# Patient Record
Sex: FEMALE | Race: WHITE | NOT HISPANIC OR LATINO | Employment: FULL TIME | URBAN - METROPOLITAN AREA
[De-identification: names, ages, dates, MRNs, and addresses within clinical notes are randomized per-mention and may not be internally consistent; named-entity substitution may affect disease eponyms.]

---

## 2017-05-30 ENCOUNTER — OFFICE VISIT (OUTPATIENT)
Dept: PULMONOLOGY | Facility: CLINIC | Age: 57
End: 2017-05-30
Attending: INTERNAL MEDICINE
Payer: COMMERCIAL

## 2017-05-30 VITALS
SYSTOLIC BLOOD PRESSURE: 114 MMHG | OXYGEN SATURATION: 99 % | TEMPERATURE: 96.9 F | WEIGHT: 146.39 LBS | DIASTOLIC BLOOD PRESSURE: 78 MMHG | HEART RATE: 68 BPM | BODY MASS INDEX: 25.93 KG/M2 | RESPIRATION RATE: 16 BRPM

## 2017-05-30 DIAGNOSIS — R91.8 PULMONARY NODULES: Primary | ICD-10-CM

## 2017-05-30 PROCEDURE — 99212 OFFICE O/P EST SF 10 MIN: CPT | Mod: ZF

## 2017-05-30 ASSESSMENT — PAIN SCALES - GENERAL: PAINLEVEL: NO PAIN (0)

## 2017-05-30 NOTE — PATIENT INSTRUCTIONS
1.  No further follow up required.   2.  Follow up with your primary in regards to the liver cysts.

## 2017-05-30 NOTE — MR AVS SNAPSHOT
After Visit Summary   5/30/2017    Samina Lewis    MRN: [id number]           Patient Information     Date Of Birth          1960        Visit Information        Provider Department      5/30/2017 9:00 AM Sawyer Mann MD [facility]         Follow-ups after your visit        Your next 10 appointments (look out time - 1 year)     May 30, 2017  8:00 AM   (Arrive by 7:45 AM)   CT CHEST W/O CONTRAST with UCCT1   [facility] ([facility])    [address] [place] [address] [phone]           Please bring any scans or X-rays taken at other hospitals, if similar tests were done. Also bring a list of your medicines, including vitamins, minerals and over-the-counter drugs. It is safest to leave personal items at home.  Be sure to tell your doctor:   If you have any allergies.   If there s any chance you are pregnant.   If you are breastfeeding.   If you have any special needs.  You do not need to do anything special to prepare.  Please wear loose clothing, such as a sweat suit or jogging clothes. Avoid snaps, zippers and other metal. We may ask you to undress and put on a hospital gown.            May 30, 2017  9:00 AM   (Arrive by 8:45 AM)   Return Visit with Sawyer Mann MD   [facility] ([facility])    [address] [place] [address] [phone]              Who to contact     If you have questions or need follow up information about today's clinic visit or your schedule please contact [facility] directly at [phone].  Normal or non-critical lab and imaging results will be communicated to you by MyChart, letter or phone within 4 business days after the clinic has received the results. If you do not hear from us within 7 days, please contact the clinic through MyChart or phone. If you have a critical  or abnormal lab result, we will notify you by phone as soon as possible.  Submit refill requests through [email or web address] or call your pharmacy and they will forward the refill request to us. Please allow 3 business days for your refill to be completed.          Additional Information About Your Visit        [employer] Information     [email or web address] gives you secure access to your electronic health record. If you see a primary care provider, you can also send messages to your care team and make appointments. If you have questions, please call your primary care clinic.  If you do not have a primary care provider, please call [phone] and they will assist you.         Blood Pressure from Last 3 Encounters:   06/07/16 113/78   03/07/16 111/76   11/23/15 132/82    Weight from Last 3 Encounters:   06/07/16 66.9 kg (147 lb 7.8 oz)   03/07/16 66.134 kg (145 lb 12.8 oz)   11/23/15 67.132 kg (148 lb)              Today, you had the following     No orders found for display       Primary Care Provider Office Phone # Fax #    Crystal Zafar [phone]       [facility] [address] [facility] [address]        Thank you!     Thank you for choosing [facility]  for your care. Our goal is always to provide you with excellent care. Hearing back from our patients is one way we can continue to improve our services. Please take a few minutes to complete the written survey that you may receive in the mail after your visit with us. Thank you!             Your Updated Medication List - Protect others around you: Learn how to safely use, store and throw away your medicines at www.disposemymeds.org.          This list is accurate as of: 8/2/16  8:49 AM.  Always use your most recent med list.                   Brand Name Dispense Instructions for use    EPINEPHrine 0.3 MG/0.3ML injection    EPIPEN    2 each    Inject 0.3 mLs (0.3 mg) into the muscle once as needed

## 2017-05-30 NOTE — NURSING NOTE
"Oncology Rooming Note    May 30, 2017 8:20 AM   Samina Lewis is a 56 year old female who presents for:    Chief Complaint   Patient presents with     Oncology Clinic Visit     Return for Lung Nodgules     Initial Vitals: /78 (BP Location: Left arm, Patient Position: Chair, Cuff Size: Adult Regular)  Pulse 68  Temp 96.9  F (36.1  C) (Oral)  Resp 16  Wt 66.4 kg (146 lb 6.2 oz)  SpO2 99%  BMI 25.93 kg/m2 Estimated body mass index is 25.93 kg/(m^2) as calculated from the following:    Height as of 3/7/16: 1.6 m (5' 3\").    Weight as of this encounter: 66.4 kg (146 lb 6.2 oz). Body surface area is 1.72 meters squared.  No Pain (0) Comment: Data Unavailable   No LMP recorded. Patient is not currently having periods (Reason: Irregular Periods).  Allergies reviewed: Yes  Medications reviewed: Yes    Medications: Medication refills not needed today.  Pharmacy name entered into [facility]:    [facility] [address], [address]  [facility] [address] [facility] [id number] [place] [address] [address]  AT [facility]    Clinical concerns: no concerns  Johnathan was notified.    6 minutes for nursing intake (face to face time)     Monique Durham MA              "

## 2017-05-30 NOTE — PROGRESS NOTES
Pulmonary Nodule Clinic - follow-up    We have been asked by Dr. Zafar to evaluate this patient in regards to nodules.        HPI:     This is a 56-year-old female with history of small indeterminate pulmonary nodules most notable in the right middle lobe which is measuring about 6 mm in largest diameter.  We had recommended a one-year follow-up CT scan and she is here today to discuss that imaging.  The initial pulmonary nodules were discovered after an exposure at work where she had dust/particular exposure with significant cough or respiratory symptoms which were persistent.  Her initial scan was in December 2015 which showed right lower lobe inflammatory changes concerning for bronchiolitis.  Her follow-up scan in May 2016 demonstrated resolved centrilobular inflammatory nodules in the right lower lobe and a few other persistent nodules which are stable when compared to previous CT scan.  She does have a personal history of basal cell skin cancer.  She is a lifetime nonsmoker.       Review of Systems:   10 point ROS performed with pertinent +/- noted in the HPI.  The remainder of the ROS was otherwise negative.        Pertinent Medications     Current Outpatient Prescriptions   Medication Sig     Probiotic Product (PROBIOTIC DAILY PO) Take by mouth daily     EPINEPHrine (EPIPEN) 0.3 MG/0.3ML injection Inject 0.3 mLs (0.3 mg) into the muscle once as needed     No current facility-administered medications for this visit.         Past Medical Hx:       Pulmonary nodule, right      Rosacea1      Basal cell carcinoma of skin            On her back.  Removed in approximately 8232-5300      Urinary calculus         Objective   Vitals:  /78 (BP Location: Left arm, Patient Position: Chair, Cuff Size: Adult Regular)  Pulse 68  Temp 96.9  F (36.1  C) (Oral)  Resp 16  Wt 66.4 kg (146 lb 6.2 oz)  SpO2 99%  BMI 25.93 kg/m2    General: Adult male, no acute distress  Pulm: CTAB  CV: RRR no murmur gallop  Neuro:  Alert and oriented        Labs / Imaging/Studies     Imaging:   CT chest:   1. Small scattered pulmonary nodules measuring <6 mm which is not  significantly changed from the 10/20/2015. These nodules are  considered statistically benign and do not require additional follow  up.  2. Scattered hepatic cysts.  3. Borderline splenomegaly.    Current, 05/2016, 12/2015                 Assessment and Plan:   Assessment: This is a 56-year-old female who is a lifetime nonsmoker who was found to have small incidental pulmonary nodules the largest one measuring about 6 mm in the right middle lobe.  She has now had an 18 month follow-up CT scan in his nodules have been stable.  No further routine recommend a follow-up for these pulmonary nodules as statistically they are benign.    She will follow-up with her primary care doctor in regards to the incidental liver cyst.    1. Pulmonary nodules  See above  - no follow up required.     I spent 30 minutes with direct face to face interaction with this patient and provided at least 50% of this time counseling and coordinating care for pulmoary nodules as noted above in the assessment and plan.     Sawyer Mann MD  Pulmonary and Critical Care  [facility]  Pager:  [phone]

## 2017-05-30 NOTE — LETTER
5/30/2017       RE: Samina Lewis  [address]  [facility] [address]     Dear Colleague,    Thank you for referring your patient, Samina Lewis, to the [facility] at [facility]. Please see a copy of my visit note below.    Pulmonary Nodule Clinic - follow-up    We have been asked by Dr. Zafar to evaluate this patient in regards to nodules.        HPI:     This is a 56-year-old female with history of small indeterminate pulmonary nodules most notable in the right middle lobe which is measuring about 6 mm in largest diameter.  We had recommended a one-year follow-up CT scan and she is here today to discuss that imaging.  The initial pulmonary nodules were discovered after an exposure at work where she had dust/particular exposure with significant cough or respiratory symptoms which were persistent.  Her initial scan was in December 2015 which showed right lower lobe inflammatory changes concerning for bronchiolitis.  Her follow-up scan in May 2016 demonstrated resolved centrilobular inflammatory nodules in the right lower lobe and a few other persistent nodules which are stable when compared to previous CT scan.  She does have a personal history of basal cell skin cancer.  She is a lifetime nonsmoker.       Review of Systems:   10 point ROS performed with pertinent +/- noted in the HPI.  The remainder of the ROS was otherwise negative.        Pertinent Medications     Current Outpatient Prescriptions   Medication Sig     Probiotic Product (PROBIOTIC DAILY PO) Take by mouth daily     EPINEPHrine (EPIPEN) 0.3 MG/0.3ML injection Inject 0.3 mLs (0.3 mg) into the muscle once as needed     No current facility-administered medications for this visit.         Past Medical Hx:       Pulmonary nodule, right      Rosacea1      Basal cell carcinoma of skin            On her back.  Removed in approximately [id number]      Urinary calculus         Objective    Vitals:  /78 (BP Location: Left arm, Patient Position: Chair, Cuff Size: Adult Regular)  Pulse 68  Temp 96.9  F (36.1  C) (Oral)  Resp 16  Wt 66.4 kg (146 lb 6.2 oz)  SpO2 99%  BMI 25.93 kg/m2    General: Adult male, no acute distress  Pulm: CTAB  CV: RRR no murmur gallop  Neuro: Alert and oriented        Labs / Imaging/Studies     Imaging:   CT chest:   1. Small scattered pulmonary nodules measuring <6 mm which is not  significantly changed from the 10/20/2015. These nodules are  considered statistically benign and do not require additional follow  up.  2. Scattered hepatic cysts.  3. Borderline splenomegaly.    Current, 05/2016, 12/2015                 Assessment and Plan:   Assessment: This is a 56-year-old female who is a lifetime nonsmoker who was found to have small incidental pulmonary nodules the largest one measuring about 6 mm in the right middle lobe.  She has now had an 18 month follow-up CT scan in his nodules have been stable.  No further routine recommend a follow-up for these pulmonary nodules as statistically they are benign.    She will follow-up with her primary care doctor in regards to the incidental liver cyst.    1. Pulmonary nodules  See above  - no follow up required.     I spent 30 minutes with direct face to face interaction with this patient and provided at least 50% of this time counseling and coordinating care for pulmoary nodules as noted above in the assessment and plan.     Sawyer Mann MD  Pulmonary and Critical Care  [facility]  Pager:  [phone]

## 2017-07-19 ENCOUNTER — NURSE TRIAGE (OUTPATIENT)
Dept: NURSING | Facility: CLINIC | Age: 57
End: 2017-07-19

## 2017-07-19 ENCOUNTER — OFFICE VISIT (OUTPATIENT)
Dept: FAMILY MEDICINE | Facility: CLINIC | Age: 57
End: 2017-07-19
Payer: COMMERCIAL

## 2017-07-19 ENCOUNTER — TELEPHONE (OUTPATIENT)
Dept: FAMILY MEDICINE | Facility: CLINIC | Age: 57
End: 2017-07-19

## 2017-07-19 VITALS
BODY MASS INDEX: 25.51 KG/M2 | HEART RATE: 76 BPM | TEMPERATURE: 97.5 F | SYSTOLIC BLOOD PRESSURE: 136 MMHG | WEIGHT: 144 LBS | DIASTOLIC BLOOD PRESSURE: 84 MMHG

## 2017-07-19 DIAGNOSIS — N13.5 URETEROVESICAL JUNCTION (UVJ) OBSTRUCTION: ICD-10-CM

## 2017-07-19 DIAGNOSIS — R10.32 ABDOMINAL PAIN, LEFT LOWER QUADRANT: ICD-10-CM

## 2017-07-19 DIAGNOSIS — R19.5 LOOSE STOOLS: ICD-10-CM

## 2017-07-19 DIAGNOSIS — R11.0 NAUSEA: ICD-10-CM

## 2017-07-19 DIAGNOSIS — R39.9 UTI SYMPTOMS: Primary | ICD-10-CM

## 2017-07-19 DIAGNOSIS — M54.50 ACUTE LEFT-SIDED LOW BACK PAIN WITHOUT SCIATICA: ICD-10-CM

## 2017-07-19 LAB
ALBUMIN UR-MCNC: 30 MG/DL
APPEARANCE UR: CLEAR
BACTERIA #/AREA URNS HPF: ABNORMAL /HPF
BILIRUB UR QL STRIP: NEGATIVE
COLOR UR AUTO: YELLOW
GLUCOSE UR STRIP-MCNC: NEGATIVE MG/DL
HGB UR QL STRIP: ABNORMAL
KETONES UR STRIP-MCNC: NEGATIVE MG/DL
LEUKOCYTE ESTERASE UR QL STRIP: NEGATIVE
MUCOUS THREADS #/AREA URNS LPF: PRESENT /LPF
NITRATE UR QL: NEGATIVE
NON-SQ EPI CELLS #/AREA URNS LPF: ABNORMAL /LPF
PH UR STRIP: 5.5 PH (ref 5–7)
RBC #/AREA URNS AUTO: ABNORMAL /HPF (ref 0–2)
SP GR UR STRIP: >1.03 (ref 1–1.03)
URN SPEC COLLECT METH UR: ABNORMAL
UROBILINOGEN UR STRIP-ACNC: 0.2 EU/DL (ref 0.2–1)
WBC #/AREA URNS AUTO: ABNORMAL /HPF (ref 0–2)

## 2017-07-19 PROCEDURE — 81001 URINALYSIS AUTO W/SCOPE: CPT | Performed by: FAMILY MEDICINE

## 2017-07-19 PROCEDURE — 99215 OFFICE O/P EST HI 40 MIN: CPT | Mod: 25 | Performed by: FAMILY MEDICINE

## 2017-07-19 PROCEDURE — 87086 URINE CULTURE/COLONY COUNT: CPT | Performed by: FAMILY MEDICINE

## 2017-07-19 PROCEDURE — 96372 THER/PROPH/DIAG INJ SC/IM: CPT | Performed by: FAMILY MEDICINE

## 2017-07-19 RX ORDER — KETOROLAC TROMETHAMINE 30 MG/ML
30 INJECTION, SOLUTION INTRAMUSCULAR; INTRAVENOUS ONCE
Qty: 1 ML | Refills: 0 | OUTPATIENT
Start: 2017-07-19 | End: 2017-07-19

## 2017-07-19 RX ORDER — OXYCODONE AND ACETAMINOPHEN 5; 325 MG/1; MG/1
1 TABLET ORAL EVERY 6 HOURS PRN
Qty: 20 TABLET | Refills: 0 | Status: SHIPPED | OUTPATIENT
Start: 2017-07-19 | End: 2017-12-12

## 2017-07-19 RX ORDER — ONDANSETRON 4 MG/1
4 TABLET, ORALLY DISINTEGRATING ORAL EVERY 8 HOURS PRN
Qty: 20 TABLET | Refills: 0 | Status: SHIPPED | OUTPATIENT
Start: 2017-07-19 | End: 2017-08-18

## 2017-07-19 RX ORDER — TAMSULOSIN HYDROCHLORIDE 0.4 MG/1
0.4 CAPSULE ORAL DAILY
Qty: 30 CAPSULE | Refills: 0 | Status: SHIPPED | OUTPATIENT
Start: 2017-07-19 | End: 2017-07-19

## 2017-07-19 RX ORDER — TAMSULOSIN HYDROCHLORIDE 0.4 MG/1
0.4 CAPSULE ORAL DAILY
Qty: 30 CAPSULE | Refills: 0 | Status: SHIPPED | OUTPATIENT
Start: 2017-07-19 | End: 2017-12-12

## 2017-07-19 NOTE — TELEPHONE ENCOUNTER
Dr. Cecilia king with writer.    Plan:  1. Flomax will help pass stone  2. Refer to urology   A. If urology is busy/cannot get in, call us tomorrow and will give more urology options  3. Stone is causing an obstruction, concern for infection:   A. If fever, chills, nausea, vomiting, worsening back pain go to ER  4. Continue percocet as prescribed    Writer called patient and reviewed plan.    Patient verbalized understanding and in agreement with plan.    Reviewed urology referral as well.    Patient would like Flomax sent to [facility] in [place].    Discussed in detail with patient signs/symptoms of infection, when to seek ER care, Percocet frequency and to not drive after taking Percocet.    Patient verbalized understanding and will call back if unable to get in with urology.    ROBERT Dobson, BSN, RN

## 2017-07-19 NOTE — TELEPHONE ENCOUNTER
Reason for Disposition    Urinating more frequently than usual (i.e., frequency)    Additional Information    Negative: Shock suspected (e.g., cold/pale/clammy skin, too weak to stand, low BP, rapid pulse)    Negative: Sounds like a life-threatening emergency to the triager    Negative: Followed a genital area injury    Negative: Followed a genital area injury (penis, scrotum)    Negative: Vaginal discharge    Negative: Pus (white, yellow) or bloody discharge from end of penis    Negative: [1] Taking antibiotic for urinary tract infection (UTI) AND [2] female    Negative: [1] Taking antibiotic for urinary tract infection (UTI) AND [2] male    Negative: [1] Discomfort (pain, burning or stinging) when passing urine AND [2] pregnant    Negative: [1] Discomfort (pain, burning or stinging) when passing urine AND [2] postpartum < 1 month    Negative: [1] Discomfort (pain, burning or stinging) when passing urine AND [2] female    Negative: [1] Discomfort (pain, burning or stinging) when passing urine AND [2] male    Negative: Pain or itching in the vulvar area    Negative: Pain in scrotum is main symptom    Negative: Blood in the urine is main symptom    Negative: Symptoms arising from use of a urinary catheter (Ayers or Coude)    Negative: [1] Unable to urinate (or only a few drops) > 4 hours AND     [2] bladder feels very full (e.g., palpable bladder or strong urge to urinate)    Negative: [1] Decreased urination and [2] drinking very little AND [2] dehydration suspected (e.g., dark urine, no urine > 12 hours, very dry mouth, very lightheaded)    Negative: Patient sounds very sick or weak to the triager    Negative: Fever > 100.5 F (38.1 C)    Negative: Side (flank) or lower back pain present    Negative: [1] Can't control passage of urine (i.e., urinary incontinence) AND [2] new onset (< 2 weeks) or worsening    Negative: Bad or foul-smelling urine    Protocols used: URINARY SYMPTOMS-ADULT-

## 2017-07-19 NOTE — PATIENT INSTRUCTIONS
No Ibuprofen, Aleve or Aspirin for 48 hours.   Percocet 1 tablet every 8 hours as needed for pain, please do not drive or take with alcohol  Zofran every 8 hours as needed for nausea   Please call [phone] to schedule imaging.

## 2017-07-19 NOTE — PROGRESS NOTES
SUBJECTIVE:                                                    Samina Lewis is a 56 year old female who presents to clinic today for the following health issues:    Pt has had intermittent UTI symptoms for one month. She was experiencing urgency, frequency and decreased stream. Then three days ago, she started experiencing diarrhea. Within 24 hours, pt has had 10 loose stools. No hematochezia or dark color. This morning pt started experiencing left back pain along with pain in the left lower abdomen. Pain is severe, stabbing & throbbing, no aggravating or relieving factors. Endorses nausea. No vomiting, fever, vaginal discharge, vaginal itching or chills. Pt feels that back pain is similar to kidney stones. Normal colonoscopy at age 49. No recent travel. No recent abx. No recent outside food. No sick contacts.     Problem list and histories reviewed & adjusted, as indicated.  Additional history: as documented    Labs reviewed in EPIC    Reviewed and updated as needed this visit by clinical staff     Reviewed and updated as needed this visit by Provider         ROS:  10 point ROS systems are negative, except as otherwise noted.      OBJECTIVE:   /84 (BP Location: Right arm, Patient Position: Chair, Cuff Size: Adult Regular)  Pulse 76  Temp 97.5  F (36.4  C) (Tympanic)  Wt 144 lb (65.3 kg)  BMI 25.51 kg/m2  Body mass index is 25.51 kg/(m^2).  GENERAL: healthy, alert and + distress  EYES: Eyes grossly normal to inspection  HENT: nose and mouth without ulcers or lesions  ABDOMEN: soft, nontender, no hepatosplenomegaly, no masses and bowel sounds normal  SKIN: no suspicious lesions or rashes  NEURO: Normal mentation intact and speech normal  BACK: ? left CVA tenderness,+ left paralumbar tenderness  PSYCH: mentation appears normal    Diagnostic Test Results:  Results for orders placed or performed in visit on 07/19/17 (from the past 24 hour(s))   UA reflex to Microscopic and Culture   Result Value Ref Range     Color Urine Yellow     Appearance Urine Clear     Glucose Urine Negative NEG mg/dL    Bilirubin Urine Negative NEG    Ketones Urine Negative NEG mg/dL    Specific Gravity Urine >1.030 1.003 - 1.035    Blood Urine Small (A) NEG    pH Urine 5.5 5.0 - 7.0 pH    Protein Albumin Urine 30 (A) NEG mg/dL    Urobilinogen Urine 0.2 0.2 - 1.0 EU/dL    Nitrite Urine Negative NEG    Leukocyte Esterase Urine Negative NEG    Source Midstream Urine    Urine Microscopic   Result Value Ref Range    WBC Urine O - 2 0 - 2 /HPF    RBC Urine 2-5 (A) 0 - 2 /HPF    Squamous Epithelial /LPF Urine Few FEW /LPF    Bacteria Urine Few (A) NEG /HPF    Mucous Urine Present (A) NEG /LPF       ASSESSMENT/PLAN:       ## UTI symptoms  - UA not c/w UTI   - Urine Culture Aerobic Bacterial pending; tx if culture is positive     ## Nausea  - ondansetron (ZOFRAN ODT) 4 MG ODT tab; Take 1 tablet (4 mg) by mouth every 8 hours as needed for nausea  Dispense: 20 tablet; Refill: 0  - CT Abdomen Pelvis w Contrast; Future    ## Acute left-sided low back pain without sciatica  - Pt was in a lot of distress while in clinic along with nausea. She was immediately given Toradol 30 mg   - D/d with back pain and LLQ abdominal pain include diverticulitis vs kidney stones vs GE vs pyelonephritis   - Will obtain CT abdomen pelvis today for further evaluation and tx as indicated   - oxyCODONE-acetaminophen (PERCOCET) 5-325 MG per tablet; Take 1 tablet by mouth every 6 hours as needed for pain maximum 4 tablet(s) per day  Dispense: 20 tablet; Refill: 0; Cautioned about sedating side effect, not to drive or take alcohol while on medication.   - No NSAIDs for next 48 hours   - ketorolac (TORADOL) 30 MG/ML injection; Inject 1 mL (30 mg) into the muscle once for 1 dose  Dispense: 1 mL; Refill: 0  - CT Abdomen Pelvis w Contrast; Future    ## Abdominal pain, left lower quadrant  - see above   - oxyCODONE-acetaminophen (PERCOCET) 5-325 MG per tablet; Take 1 tablet by mouth  every 6 hours as needed for pain maximum 4 tablet(s) per day  Dispense: 20 tablet; Refill: 0  - ketorolac (TORADOL) 30 MG/ML injection; Inject 1 mL (30 mg) into the muscle once for 1 dose  Dispense: 1 mL; Refill: 0  - CT Abdomen Pelvis w Contrast; Future    ## Loose stools  - Likely viral etiology, continue to monitor. If not improving then will do stool cultures.       Addendum CT showed   Obstructive 4 mm left ureterovesicular junction calculus associated with mild left hydroureter.      Narciso Brooks MD  [facility]

## 2017-07-19 NOTE — TELEPHONE ENCOUNTER
Discussed with SOHA Munson who called pt with below.   Prescribed Flomax. Referred to Urology for further evaluation. Due to obstructing calculus, reviewed s/s of sepsis. If experiencing symptoms then would need to go to ER for further evaluation.   CT IMPRESSION: Obstructive 4 mm left ureterovesicular junction calculus  associated with mild left hydroureter.    DM

## 2017-07-19 NOTE — MR AVS SNAPSHOT
After Visit Summary   7/19/2017    Samina Lewis    MRN: [id number]           Patient Information     Date Of Birth          1960        Visit Information        Provider Department      7/19/2017 9:00 AM Narciso Brooks MD [facility]        Today's Diagnoses     UTI symptoms    -  1    Nausea        Acute left-sided low back pain without sciatica        Abdominal pain, left lower quadrant          Care Instructions    No Ibuprofen, Aleve or Aspirin for 48 hours.   Percocet 1 tablet every 8 hours as needed for pain, please do not drive or take with alcohol  Zofran every 8 hours as needed for nausea   Please call [phone] to schedule imaging.           Follow-ups after your visit        Future tests that were ordered for you today     Open Future Orders        Priority Expected Expires Ordered    CT Abdomen Pelvis w Contrast Routine  7/19/2018 7/19/2017            Who to contact     If you have questions or need follow up information about today's clinic visit or your schedule please contact [facility] directly at [phone].  Normal or non-critical lab and imaging results will be communicated to you by [employer], letter or phone within 4 business days after the clinic has received the results. If you do not hear from us within 7 days, please contact the clinic through [employer] or phone. If you have a critical or abnormal lab result, we will notify you by phone as soon as possible.  Submit refill requests through [employer] or call your pharmacy and they will forward the refill request to us. Please allow 3 business days for your refill to be completed.          Additional Information About Your Visit        [employer] Information     [employer] gives you secure access to your electronic health record. If you see a primary care provider, you can also send messages to your care team and make appointments. If you have questions, please call your primary care clinic.   If you do not have a primary care provider, please call [phone] and they will assist you.        Care EveryWhere ID     This is your Care EveryWhere ID. This could be used by other organizations to access your [facility] medical records  [id number]        Your Vitals Were     Pulse Temperature BMI (Body Mass Index)             76 97.5  F (36.4  C) (Tympanic) 25.51 kg/m2          Blood Pressure from Last 3 Encounters:   07/19/17 136/84   05/30/17 114/78   06/07/16 113/78    Weight from Last 3 Encounters:   07/19/17 144 lb (65.3 kg)   05/30/17 146 lb 6.2 oz (66.4 kg)   06/07/16 147 lb 7.8 oz (66.9 kg)              We Performed the Following     UA reflex to Microscopic and Culture     Urine Microscopic          Today's Medication Changes          These changes are accurate as of: 7/19/17  9:10 AM.  If you have any questions, ask your nurse or doctor.               Start taking these medicines.        Dose/Directions    ketorolac 30 MG/ML injection   Commonly known as:  TORADOL   Used for:  Acute left-sided low back pain without sciatica, Abdominal pain, left lower quadrant   Started by:  Narciso Brooks MD        Dose:  30 mg   Inject 1 mL (30 mg) into the muscle once for 1 dose   Quantity:  1 mL   Refills:  0       ondansetron 4 MG ODT tab   Commonly known as:  ZOFRAN ODT   Used for:  Nausea   Started by:  Narciso Brooks MD        Dose:  4 mg   Take 1 tablet (4 mg) by mouth every 8 hours as needed for nausea   Quantity:  20 tablet   Refills:  0       oxyCODONE-acetaminophen 5-325 MG per tablet   Commonly known as:  PERCOCET   Used for:  Acute left-sided low back pain without sciatica, Abdominal pain, left lower quadrant   Started by:  Narciso Brooks MD        Dose:  1 tablet   Take 1 tablet by mouth every 6 hours as needed for pain maximum 4 tablet(s) per day   Quantity:  20 tablet   Refills:  0            Where to get your medicines      These medications were sent to [facility]  [place], [place] - [address]  [address], [facility] [address]     Phone:  [phone]     ondansetron 4 MG ODT tab         Some of these will need a paper prescription and others can be bought over the counter.  Ask your nurse if you have questions.     Bring a paper prescription for each of these medications     oxyCODONE-acetaminophen 5-325 MG per tablet       You don't need a prescription for these medications     ketorolac 30 MG/ML injection                Primary Care Provider Office Phone # Fax #    Crystal Zafar [phone]       [facility] [address] [facility] [address]        Equal Access to Services     DOMENICO BRIGHT : Hadii aad ku hadasho Soveronica, waaxda luqadaha, qaybta kaalmada adeegyada, flip jaquez. So [facility] [phone].    ATENCIÓN: Si habla español, tiene a hanley disposición servicios gratuitos de asistencia lingüística. Llame al [phone].    We comply with applicable federal civil rights laws and [place] laws. We do not discriminate on the basis of race, color, national origin, age, disability sex, sexual orientation or gender identity.            Thank you!     Thank you for choosing [facility]  for your care. Our goal is always to provide you with excellent care. Hearing back from our patients is one way we can continue to improve our services. Please take a few minutes to complete the written survey that you may receive in the mail after your visit with us. Thank you!             Your Updated Medication List - Protect others around you: Learn how to safely use, store and throw away your medicines at www.disposemymeds.org.          This list is accurate as of: 7/19/17  9:10 AM.  Always use your most recent med list.                   Brand Name Dispense Instructions for use Diagnosis    EPINEPHrine 0.3 MG/0.3ML injection 2-pack    EPIPEN/ADRENACLICK/or ANY BX GENERIC EQUIV    2 each    Inject  0.3 mLs (0.3 mg) into the muscle once as needed    Allergic to bees       ketorolac 30 MG/ML injection    TORADOL    1 mL    Inject 1 mL (30 mg) into the muscle once for 1 dose    Acute left-sided low back pain without sciatica, Abdominal pain, left lower quadrant       ondansetron 4 MG ODT tab    ZOFRAN ODT    20 tablet    Take 1 tablet (4 mg) by mouth every 8 hours as needed for nausea    Nausea       oxyCODONE-acetaminophen 5-325 MG per tablet    PERCOCET    20 tablet    Take 1 tablet by mouth every 6 hours as needed for pain maximum 4 tablet(s) per day    Acute left-sided low back pain without sciatica, Abdominal pain, left lower quadrant       PROBIOTIC DAILY PO      Take by mouth daily

## 2017-07-19 NOTE — NURSING NOTE
"Chief Complaint   Patient presents with     UTI       Initial /84 (BP Location: Right arm, Patient Position: Chair, Cuff Size: Adult Regular)  Pulse 76  Temp 97.5  F (36.4  C) (Tympanic)  Wt 144 lb (65.3 kg)  BMI 25.51 kg/m2 Estimated body mass index is 25.51 kg/(m^2) as calculated from the following:    Height as of 3/7/16: 5' 3\" (1.6 m).    Weight as of this encounter: 144 lb (65.3 kg).  Medication Reconciliation: complete     Kasandra Ferraro MA      "

## 2017-07-20 ENCOUNTER — TELEPHONE (OUTPATIENT)
Dept: UROLOGY | Facility: CLINIC | Age: 57
End: 2017-07-20

## 2017-07-20 LAB
BACTERIA SPEC CULT: NO GROWTH
MICRO REPORT STATUS: NORMAL
SPECIMEN SOURCE: NORMAL

## 2017-07-20 NOTE — TELEPHONE ENCOUNTER
tamsulosin (FLOMAX) 0.4 MG capsule         Last Written Prescription Date: 7/19/17  Last Fill Quantity: 30, # refills: 0    Last Office Visit with [facility], [facility] or [facility] prescribing provider:  7/19/17   Future Office Visit:      BP Readings from Last 3 Encounters:   07/19/17 136/84   05/30/17 114/78   06/07/16 113/78

## 2017-07-20 NOTE — TELEPHONE ENCOUNTER
Patient called regarding her recent episode of flank pain her ct scan showed obstruction 4 mm stone  She feels definitely that the stone has passed.  Recommended she make follow up appointment with dr ryan in the near future. Bethany Lang LPN Staff Nurse

## 2017-07-21 DIAGNOSIS — N20.0 CALCULUS OF KIDNEY: Primary | ICD-10-CM

## 2017-07-21 RX ORDER — TAMSULOSIN HYDROCHLORIDE 0.4 MG/1
CAPSULE ORAL
Qty: 30 CAPSULE | Refills: 0 | OUTPATIENT
Start: 2017-07-21

## 2017-08-15 ENCOUNTER — RADIANT APPOINTMENT (OUTPATIENT)
Dept: GENERAL RADIOLOGY | Facility: CLINIC | Age: 57
End: 2017-08-15
Attending: UROLOGY
Payer: COMMERCIAL

## 2017-08-15 ENCOUNTER — TELEPHONE (OUTPATIENT)
Dept: UROLOGY | Facility: CLINIC | Age: 57
End: 2017-08-15

## 2017-08-15 ENCOUNTER — RADIANT APPOINTMENT (OUTPATIENT)
Dept: ULTRASOUND IMAGING | Facility: CLINIC | Age: 57
End: 2017-08-15
Attending: UROLOGY
Payer: COMMERCIAL

## 2017-08-15 ENCOUNTER — PRE VISIT (OUTPATIENT)
Dept: UROLOGY | Facility: CLINIC | Age: 57
End: 2017-08-15

## 2017-08-15 DIAGNOSIS — N20.0 CALCULUS OF KIDNEY: ICD-10-CM

## 2017-08-15 PROCEDURE — 76770 US EXAM ABDO BACK WALL COMP: CPT | Performed by: RADIOLOGY

## 2017-08-15 PROCEDURE — 74010 XR KUB: CPT | Mod: 52 | Performed by: RADIOLOGY

## 2017-08-15 NOTE — TELEPHONE ENCOUNTER
Patient called and having urgency and frequency  Had renal ultrasound yesterday. Bethany Lang LPN Staff Nurse

## 2017-08-17 ENCOUNTER — MYC MEDICAL ADVICE (OUTPATIENT)
Dept: FAMILY MEDICINE | Facility: CLINIC | Age: 57
End: 2017-08-17

## 2017-08-17 ENCOUNTER — PRE VISIT (OUTPATIENT)
Dept: UROLOGY | Facility: CLINIC | Age: 57
End: 2017-08-17

## 2017-08-17 ENCOUNTER — TELEPHONE (OUTPATIENT)
Dept: UROLOGY | Facility: CLINIC | Age: 57
End: 2017-08-17

## 2017-08-17 DIAGNOSIS — N20.0 CALCULUS OF KIDNEY: Primary | ICD-10-CM

## 2017-08-17 NOTE — TELEPHONE ENCOUNTER
[place] about her situation.  Told her to push fluids take flomax and get over the counter pyridium only take for a couple of days.  Moved her appointment up to sept 5th to be seen. Bethany Lang LPN Staff Nurse

## 2017-08-17 NOTE — TELEPHONE ENCOUNTER
PREVISIT INFORMATION                                                    Samina Lewis scheduled for future visit at [facility] specialty clinics.    Patient is scheduled to see Lai on 08/18/2017  Reason for visit: calculus kidney   Referring provider: Alexa Vargas APRN CNP  Has patient seen previous specialist? No  Medical Records:  Available in chart.  Patient was previously seen at a [place] or [facility] facility.     REVIEW                                                      New patient packet mailed to patient: No  Medication reconciliation complete: No      PLAN/FOLLOW-UP NEEDED                                                      Patient Reminders Given:    Informed patient to bring an updated list of allergies, medications, pharmacy details and insurance information. Directed patient to come to the 2nd floor, check-in #4 for their appointment. Informed patient to call back if appointment needs to be cancelled or rescheduled at [phone].    Reminded patient to bring any outside records regarding this appointment or have them faxed to clinic at [phone].    Reminded patient to complete and bring in urology questionnaire. Also for patient to please come with a full bladder and to ask , if early to get staff member for sample.

## 2017-08-17 NOTE — TELEPHONE ENCOUNTER
----- Message from Tyler Li MD sent at 8/16/2017  2:14 PM CDT -----  This patient still has a stone in her left distal ureter which is likely the cause of her urinary frequency and urgency.  Happy to see her whenever.  She can see anyone quite honestly.    Miguel

## 2017-08-18 ENCOUNTER — OFFICE VISIT (OUTPATIENT)
Dept: UROLOGY | Facility: CLINIC | Age: 57
End: 2017-08-18
Attending: NURSE PRACTITIONER
Payer: COMMERCIAL

## 2017-08-18 ENCOUNTER — MYC MEDICAL ADVICE (OUTPATIENT)
Dept: FAMILY MEDICINE | Facility: CLINIC | Age: 57
End: 2017-08-18

## 2017-08-18 VITALS
DIASTOLIC BLOOD PRESSURE: 76 MMHG | OXYGEN SATURATION: 98 % | TEMPERATURE: 97.1 F | HEART RATE: 71 BPM | SYSTOLIC BLOOD PRESSURE: 119 MMHG

## 2017-08-18 DIAGNOSIS — R35.0 URINARY FREQUENCY: ICD-10-CM

## 2017-08-18 DIAGNOSIS — N20.1 CALCULUS OF URETER: Primary | ICD-10-CM

## 2017-08-18 LAB
ALBUMIN UR-MCNC: NEGATIVE MG/DL
APPEARANCE UR: CLEAR
BILIRUB UR QL STRIP: NEGATIVE
COLOR UR AUTO: NORMAL
GLUCOSE UR STRIP-MCNC: NEGATIVE MG/DL
HGB UR QL STRIP: NEGATIVE
KETONES UR STRIP-MCNC: NEGATIVE MG/DL
LEUKOCYTE ESTERASE UR QL STRIP: NEGATIVE
NITRATE UR QL: NEGATIVE
PH UR STRIP: 7 PH (ref 5–7)
SOURCE: NORMAL
SP GR UR STRIP: 1.01 (ref 1–1.03)
UROBILINOGEN UR STRIP-MCNC: NORMAL MG/DL (ref 0–2)

## 2017-08-18 PROCEDURE — 99214 OFFICE O/P EST MOD 30 MIN: CPT | Mod: 25 | Performed by: PHYSICIAN ASSISTANT

## 2017-08-18 PROCEDURE — 51798 US URINE CAPACITY MEASURE: CPT | Performed by: PHYSICIAN ASSISTANT

## 2017-08-18 PROCEDURE — 81003 URINALYSIS AUTO W/O SCOPE: CPT | Performed by: PHYSICIAN ASSISTANT

## 2017-08-18 ASSESSMENT — PAIN SCALES - GENERAL: PAINLEVEL: NO PAIN (0)

## 2017-08-18 NOTE — MR AVS SNAPSHOT
After Visit Summary   8/18/2017    Samina Lewis    MRN: [id number]           Patient Information     Date Of Birth          1960        Visit Information        Provider Department      8/18/2017 2:00 PM Eva Martinez PA-C [facility]        Today's Diagnoses     Urinary calculus    -  1    Calculus of ureter           Follow-ups after your visit        Your next 10 appointments already scheduled     Aug 18, 2017  4:00 PM CDT   CT ABDOMEN PELVIS W/O CONTRAST with UCCT2   [facility] ([facility])    [address] [address] [phone]           Please bring any scans or X-rays taken at other hospitals, if similar tests were done. Also bring a list of your medicines, including vitamins, minerals and over-the-counter drugs. It is safest to leave personal items at home.  Be sure to tell your doctor:   If you have any allergies.   If there s any chance you are pregnant.   If you are breastfeeding.   If you have any special needs.  You may have contrast for this exam. To prepare:   Do not eat or drink for 2 hours before your exam. If you need to take medicine, you may take it with small sips of water. (We may ask you to take liquid medicine as well.)   The day before your exam, drink extra fluids at least six 8-ounce glasses (unless your doctor tells you to restrict your fluids).  Patients over 70 or patients with diabetes or kidney problems:   If you haven t had a blood test (creatinine test) within the last 30 days, go to your clinic or Diagnostic Imaging Department for this test.  If you have diabetes:   If your kidney function is normal, continue taking your metformin (Avandamet, Glucophage, Glucovance, Metaglip) on the day of your exam.   If your kidney function is abnormal, wait 48 hours before restarting this medicine.  You will have oral contrast for this exam:   You will drink the contrast  at home. Get this from your clinic or Diagnostic Imaging Department. Please follow the directions given.  Please wear loose clothing, such as a sweat suit or jogging clothes. Avoid snaps, zippers and other metal. We may ask you to undress and put on a hospital gown.  If you have any questions, please call the Imaging Department where you will have your exam.            Sep 05, 2017 11:15 AM CDT   (Arrive by 11:00 AM)   New Renal Calculi with Tyler Li MD   [facility] ([facility])    [address] [facility]  [address] [facility] [address] [phone]              Future tests that were ordered for you today     Open Future Orders        Priority Expected Expires Ordered    CT Abdomen Pelvis w/o Contrast [[id number]] Routine  8/18/2018 8/18/2017            Who to contact     If you have questions or need follow up information about today's clinic visit or your schedule please contact [facility] directly at [phone].  Normal or non-critical lab and imaging results will be communicated to you by [employer], letter or phone within 4 business days after the clinic has received the results. If you do not hear from us within 7 days, please contact the clinic through [employer] or phone. If you have a critical or abnormal lab result, we will notify you by phone as soon as possible.  Submit refill requests through [email or web address] or call your pharmacy and they will forward the refill request to us. Please allow 3 business days for your refill to be completed.          Additional Information About Your Visit        [email or web address] Information     [email or web address] gives you secure access to your electronic health record. If you see a primary care provider, you can also send messages to your care team and make appointments. If you have questions, please call your primary care clinic.  If you do not have a primary care provider, please call [phone]  and they will assist you.      [employer]Star is an electronic gateway that provides easy, online access to your medical records. With [employer]Star, you can request a clinic appointment, read your test results, renew a prescription or communicate with your care team.     To access your existing account, please contact your [facility] or call [phone] for assistance.        Care EveryWhere ID     This is your Care EveryWhere ID. This could be used by other organizations to access your [place] medical records  [id number]        Your Vitals Were     Pulse Temperature Pulse Oximetry             71 97.1  F (36.2  C) (Oral) 98%          Blood Pressure from Last 3 Encounters:   08/18/17 119/76   07/19/17 136/84   05/30/17 114/78    Weight from Last 3 Encounters:   07/19/17 65.3 kg (144 lb)   05/30/17 66.4 kg (146 lb 6.2 oz)   06/07/16 66.9 kg (147 lb 7.8 oz)              We Performed the Following     MEASURE POST-VOID RESIDUAL URINE/BLADDER CAPACITY, US NON-IMAGING     UA reflex to Microscopic and Culture        Primary Care Provider Office Phone # Fax #    Crystal Zafar [phone] [address] [facility] [address]        Equal Access to Services     DOMENICO BRIGHT : Hadii aad ku hadasho Soomaali, waaxda luqadaha, qaybta kaalmada adeegyada, waxay grzegorz haybranden lugo . So [facility] [phone].    ATENCIÓN: Si habla español, tiene a hanley disposición servicios gratuitos de asistencia lingüística. Llame al [phone].    We comply with applicable federal civil rights laws and [place] laws. We do not discriminate on the basis of race, color, national origin, age, disability sex, sexual orientation or gender identity.            Thank you!     Thank you for choosing [facility]  for your care. Our goal is always to provide you with excellent care. Hearing back from our patients is one way we can continue to improve our services. Please take a  few minutes to complete the written survey that you may receive in the mail after your visit with us. Thank you!             Your Updated Medication List - Protect others around you: Learn how to safely use, store and throw away your medicines at www.disposemymeds.org.          This list is accurate as of: 8/18/17  2:51 PM.  Always use your most recent med list.                   Brand Name Dispense Instructions for use Diagnosis    EPINEPHrine 0.3 MG/0.3ML injection 2-pack    EPIPEN/ADRENACLICK/or ANY BX GENERIC EQUIV    2 each    Inject 0.3 mLs (0.3 mg) into the muscle once as needed    Allergic to bees       oxyCODONE-acetaminophen 5-325 MG per tablet    PERCOCET    20 tablet    Take 1 tablet by mouth every 6 hours as needed for pain maximum 4 tablet(s) per day    Acute left-sided low back pain without sciatica, Abdominal pain, left lower quadrant       PROBIOTIC DAILY PO      Take by mouth daily        tamsulosin 0.4 MG capsule    FLOMAX    30 capsule    Take 1 capsule (0.4 mg) by mouth daily    Ureterovesical junction (UVJ) obstruction

## 2017-08-18 NOTE — TELEPHONE ENCOUNTER
Appears this CT exam was ordered by another Provider and did not release the results to [facility] for the patient.    Provider, can you release them to the patient?

## 2017-08-18 NOTE — NURSING NOTE
"Samina Lewis's goals for this visit include:   Chief Complaint   Patient presents with     Consult     calculas of Kidney        She requests these members of her care team be copied on today's visit information: yes     Referring Provider:  BERTHA Phillip CNP  [address] [place] [address]    Initial /76 (BP Location: Left arm, Patient Position: Chair, Cuff Size: Adult Regular)  Pulse 71  Temp 97.1  F (36.2  C) (Oral)  SpO2 98% Estimated body mass index is 25.51 kg/(m^2) as calculated from the following:    Height as of 3/7/16: 1.6 m (5' 3\").    Weight as of 7/19/17: 65.3 kg (144 lb).  BP completed using cuff size: regular    post void residual - 0 cc    "

## 2017-08-18 NOTE — PROGRESS NOTES
CC: ureteral stone.    HPI: It is a pleasure to see Ms. Samina Lewis, a very pleasant 56 year old female seen today in the urology clinic in consultation from BERTHA Gary CNP for evaluation of the above. This was first detected on CT scan from 7/19/17 after the patient presented to primary clinic complaining of acute left flank pain along with urinary frequency and urgency. The stone measures 4 mm and is located in the distal left ureter at the left UVJ with associated mild left hydronephrosis. UA showed some blood but was negative for infection. BMP revealed Cr to be 1.0, GFR 58. She was then started on Flomax and instructed to follow up with urology. She did have KUB and renal ultrasound performed 3 days ago. The KUB showed a few calcifications in the left lower pelvis - question whether these represent continued presence of left UVJ stone versus pelvic phleboliths. Renal ultrasound showed left hydro was resolved.    Currently, the patient reports her flank pain is resolved but she continues to have some urinary frequency and urgency - worse in the mornings, although this seems to have improved with increasing her fluid intake. Has not been straining her urine and has not visualized any stones passing thus far. Denies gross hematuria, dysuria, fevers, chills, N/V. Had one other kidney stone 20 years ago which passed uneventfully. She is supposed to leave for [place] for 2 weeks next week so is very concerned about this stone still being retained.    RECENT IMAGING:  CT ABDOMEN PELVIS W CONTRAST, 7/19/2017   IMPRESSION: Obstructive 4 mm left ureterovesicular junction calculus  associated with mild left hydroureter.    XR KUB 8/15/2017   Impression: No evidence of nephrolithiasis. A 4 mm radiopacity in the  left pelvis likely represents the recently seen left UVJ stone on  7/19/2017 CT abdomen.    US RENAL COMPLETE, 8/15/2017   IMPRESSION:  1.  No hydronephrosis.    Past Medical History:   Diagnosis Date      Basal cell carcinoma of the skin 2004    back     MEDICAL HISTORY OF - 11/09    colonoscopy done repeat 5-10 years     Urinary calculus, unspecified 1994    Renal stones     Past Surgical History:   Procedure Laterality Date     COLONOSCOPY  2009     DIL URETHRA,FEMALE,INITIAL  1996     HC ENLARGE BREAST WITH IMPLANT  1989     TONSILLECTOMY  1965     Current Outpatient Prescriptions   Medication Sig Dispense Refill     oxyCODONE-acetaminophen (PERCOCET) 5-325 MG per tablet Take 1 tablet by mouth every 6 hours as needed for pain maximum 4 tablet(s) per day 20 tablet 0     tamsulosin (FLOMAX) 0.4 MG capsule Take 1 capsule (0.4 mg) by mouth daily 30 capsule 0     Probiotic Product (PROBIOTIC DAILY PO) Take by mouth daily       EPINEPHrine (EPIPEN) 0.3 MG/0.3ML injection Inject 0.3 mLs (0.3 mg) into the muscle once as needed 2 each 3     Allergies   Allergen Reactions     Bee Venom      Swelling     Nkda [No Known Drug Allergies]      FAMILY HISTORY: There is no family h/o  malignancy.  There is no family h/o urolithiasis.     SOCIAL HISTORY: The patient does not smoke cigarettes, minimal EtOH and no illicit drug use.     ROS: A comprehensive 14 point ROS was obtained and was positive for history of kidney stones and otherwise negative except for that outlined above in the HPI.    GENERAL PHYSICAL EXAM:   Vitals: /76 (BP Location: Left arm, Patient Position: Chair, Cuff Size: Adult Regular)  Pulse 71  Temp 97.1  F (36.2  C) (Oral)  SpO2 98%  There is no height or weight on file to calculate BMI.  GENERAL: Well groomed, well developed, well nourished female in NAD.  HEAD: Normocephalic. Atraumatic.  RESPIRATORY: No increased respiratory effort.   MS: Full ROM in extremities, gait normal, normal muscle tone  SKIN: Warm to touch, dry.  No visible rashes or lesions on examined areas.  NEURO: Alert and oriented x 3.  PSYCH: Normal mood and affect, pleasant and agreeable during interview and exam.    UA today  completely negative - no blood.    Creatinine   Date Value Ref Range Status   07/11/2012 0.82 0.52 - 1.04 mg/dL Final       ASSESSMENT AND PLAN:   Ms. Samina Lewis is a 56 year old female with a history of nephrolithiasis who was recently diagnosed with a 4 mm left UVJ stone. No longer having acute flank or abdominal pain, but continues to have some urinary frequency/urgency, although this seems to be improving somewhat as well.   Recent KUB shows a few calcifications in the left low hemipelvis - question whether one of these may represent the left UVJ stone vs pelvic phleboliths.   Renal ultrasound shows previously seen left hydro has resolved and her UA today is without blood.   She has not visualized any stones pass but has not been straining her urine. Is supposed to travel to [place] next week for a 2 week trip.    She is very worried about traveling without knowing whether the stone has passed or not.   -Will therefore schedule repeat CT A/P w/o contrast to monitor.    Will also continue Flomax once daily for now.  Will strain urine and submit any captured stones for analysis.   Push fluids.  Ibuprofen PRN for pain. She has narcotics available for severe pain.    - Warning signs discussed including fevers, chills, N/V, gross hematuria, severe pain that is refractory to medications which should prompt more urgent evaluation. Patient understands to proceed to the ER should these warning signs occur outside of clinic hours.    Standard recommendations on kidney stone prevention were provided.  These include maintaining fluid intake of 3 liters per day or more with a goal of making 2 or more liters of urine per day.  If alcoholic or caffeinated beverages are consumed then she needs to drink water along with these beverages to maintain hydration.  A few ounces of lemon juice concentrate a day diluted in water can help prevent stones.  She should limit her intake of red meat, salt, and salty processed foods.  She  should maintain calcium intake in her diet through continued consumption of dairy products etc.  She should limit foods that are high in oxalate such as spinach, sweet potatoe, dark chocolate, soy products, and some nuts such as peanuts. Additional/different recommendations pending any stone analysis.    I have enjoyed participating in the medical care of this very pleasant patient and will continue to keep you updated on her progress.  Please don't hesitate to contact me with any questions or concerns.      Eva Martinez PA-C  Department of Urology

## 2017-08-24 ENCOUNTER — PRE VISIT (OUTPATIENT)
Dept: UROLOGY | Facility: CLINIC | Age: 57
End: 2017-08-24

## 2017-10-02 ENCOUNTER — RADIANT APPOINTMENT (OUTPATIENT)
Dept: MAMMOGRAPHY | Facility: CLINIC | Age: 57
End: 2017-10-02
Attending: FAMILY MEDICINE
Payer: COMMERCIAL

## 2017-10-02 DIAGNOSIS — Z12.31 SCREENING MAMMOGRAM, ENCOUNTER FOR: ICD-10-CM

## 2017-10-02 PROCEDURE — G0202 SCR MAMMO BI INCL CAD: HCPCS | Performed by: STUDENT IN AN ORGANIZED HEALTH CARE EDUCATION/TRAINING PROGRAM

## 2017-10-02 PROCEDURE — 77063 BREAST TOMOSYNTHESIS BI: CPT | Performed by: STUDENT IN AN ORGANIZED HEALTH CARE EDUCATION/TRAINING PROGRAM

## 2017-12-12 ENCOUNTER — OFFICE VISIT (OUTPATIENT)
Dept: FAMILY MEDICINE | Facility: CLINIC | Age: 57
End: 2017-12-12
Payer: COMMERCIAL

## 2017-12-12 VITALS
OXYGEN SATURATION: 100 % | HEART RATE: 84 BPM | BODY MASS INDEX: 26.06 KG/M2 | SYSTOLIC BLOOD PRESSURE: 132 MMHG | WEIGHT: 147.1 LBS | RESPIRATION RATE: 18 BRPM | DIASTOLIC BLOOD PRESSURE: 70 MMHG | HEIGHT: 63 IN | TEMPERATURE: 98.8 F

## 2017-12-12 DIAGNOSIS — H65.92 OME (OTITIS MEDIA WITH EFFUSION), LEFT: ICD-10-CM

## 2017-12-12 DIAGNOSIS — L70.9 ACNE, UNSPECIFIED ACNE TYPE: ICD-10-CM

## 2017-12-12 DIAGNOSIS — R05.9 COUGH: Primary | ICD-10-CM

## 2017-12-12 PROCEDURE — 99214 OFFICE O/P EST MOD 30 MIN: CPT | Performed by: FAMILY MEDICINE

## 2017-12-12 RX ORDER — CODEINE PHOSPHATE AND GUAIFENESIN 10; 100 MG/5ML; MG/5ML
1 SOLUTION ORAL EVERY 4 HOURS PRN
Qty: 120 ML | Refills: 0 | Status: SHIPPED | OUTPATIENT
Start: 2017-12-12 | End: 2019-02-05

## 2017-12-12 RX ORDER — PREDNISONE 20 MG/1
40 TABLET ORAL DAILY
Qty: 10 TABLET | Refills: 0 | Status: SHIPPED | OUTPATIENT
Start: 2017-12-12 | End: 2017-12-17

## 2017-12-12 RX ORDER — AMOXICILLIN 875 MG
875 TABLET ORAL 2 TIMES DAILY
Qty: 10 TABLET | Refills: 0 | Status: SHIPPED | OUTPATIENT
Start: 2017-12-12 | End: 2017-12-17

## 2017-12-12 ASSESSMENT — PAIN SCALES - GENERAL: PAINLEVEL: NO PAIN (0)

## 2017-12-12 NOTE — MR AVS SNAPSHOT
After Visit Summary   12/12/2017    Samina Lewis    MRN: [id number]           Patient Information     Date Of Birth          1960        Visit Information        Provider Department      12/12/2017 1:40 PM Crystal Zafar MD [facility]        Today's Diagnoses     Cough    -  1    OME (otitis media with effusion), left        Acne, unspecified acne type          Care Instructions    Start taking Prednisone 20mg, Robitussin -10mg, Amoxicillin 875mg.     Referral to Dermatology.           Follow-ups after your visit        Additional Services     SKIN CARE REFERRAL       Your provider has referred you to: [facility]: [facility] - [place] [phone]   [email or web address]     Please be aware that coverage of these services is subject to the terms and limitations of your health insurance plan.  Please check with your insurance prior to the appointment to ensure appropriate coverage for any services considered cosmetic in nature or not medically necessary.    Please bring the following with you to your appointment:    (1) Any X-Rays, CTs or MRIs which have been performed.  Contact the facility where they were done to arrange for  prior to your scheduled appointment.  Any new CT, MRI or other procedures ordered by your specialist must be performed at a [facility] facility or coordinated by your clinic's referral office.  (2) List of current medications  (3) This referral request   (4) Any documents/labs given to you for this referral                  Follow-up notes from your care team     Return if symptoms worsen or fail to improve.      Who to contact     If you have questions or need follow up information about today's clinic visit or your schedule please contact [facility] directly at [phone].  Normal or non-critical lab and imaging results will be communicated to you by MyChart, letter or phone  "within 4 business days after the clinic has received the results. If you do not hear from us within 7 days, please contact the clinic through [employer] or phone. If you have a critical or abnormal lab result, we will notify you by phone as soon as possible.  Submit refill requests through [employer] or call your pharmacy and they will forward the refill request to us. Please allow 3 business days for your refill to be completed.          Additional Information About Your Visit        [employer] Information     [employer] gives you secure access to your electronic health record. If you see a primary care provider, you can also send messages to your care team and make appointments. If you have questions, please call your primary care clinic.  If you do not have a primary care provider, please call [phone] and they will assist you.        Care EveryWhere ID     This is your Care EveryWhere ID. This could be used by other organizations to access your [facility] medical records  [id number]        Your Vitals Were     Pulse Temperature Respirations Height Pulse Oximetry BMI (Body Mass Index)    84 98.8  F (37.1  C) (Temporal) 18 5' 2.8\" (1.595 m) 100% 26.23 kg/m2       Blood Pressure from Last 3 Encounters:   12/12/17 132/70   08/18/17 119/76   07/19/17 136/84    Weight from Last 3 Encounters:   12/12/17 147 lb 1.6 oz (66.7 kg)   07/19/17 144 lb (65.3 kg)   05/30/17 146 lb 6.2 oz (66.4 kg)              We Performed the Following     SKIN CARE REFERRAL          Today's Medication Changes          These changes are accurate as of: 12/12/17 11:59 PM.  If you have any questions, ask your nurse or doctor.               Start taking these medicines.        Dose/Directions    amoxicillin 875 MG tablet   Commonly known as:  AMOXIL   Used for:  OME (otitis media with effusion), left   Started by:  Crystal Zafar MD        Dose:  875 mg   Take 1 tablet (875 mg) by mouth 2 times daily for 5 days   Quantity:  10 tablet   Refills:  0    "    guaiFENesin-codeine 100-10 MG/5ML Soln solution   Commonly known as:  ROBITUSSIN AC   Used for:  Cough   Started by:  Crystal Zafar MD        Dose:  1 tsp.   Take 5 mLs by mouth every 4 hours as needed for cough   Quantity:  120 mL   Refills:  0       predniSONE 20 MG tablet   Commonly known as:  DELTASONE   Used for:  OME (otitis media with effusion), left, Cough   Started by:  Crystal Zafar MD        Dose:  40 mg   Take 2 tablets (40 mg) by mouth daily for 5 days   Quantity:  10 tablet   Refills:  0         Stop taking these medicines if you haven't already. Please contact your care team if you have questions.     oxyCODONE-acetaminophen 5-325 MG per tablet   Commonly known as:  PERCOCET   Stopped by:  Crystal Zafar MD           tamsulosin 0.4 MG capsule   Commonly known as:  FLOMAX   Stopped by:  Crystal Zafar MD                Where to get your medicines      These medications were sent to [facility] - [place], [place] - [address], [address]  [address], [address], [facility] [address]     Phone:  [phone]     amoxicillin 875 MG tablet    predniSONE 20 MG tablet         Some of these will need a paper prescription and others can be bought over the counter.  Ask your nurse if you have questions.     Bring a paper prescription for each of these medications     guaiFENesin-codeine 100-10 MG/5ML Soln solution                Primary Care Provider Office Phone # Fax #    Crystal Zafar [phone] [address] [facility] [address]        Equal Access to Services     [facility]: Hadii aad ku hadasho Soomaali, waaxda luqadaha, qaybta kaalmada rosa mariaegyada, flip jaquez. So [facility] [phone].    ATENCIÓN: Si habla español, tiene a hanley disposición servicios gratuitos de asistencia lingüística. Llame al [phone].    We comply with applicable federal civil rights laws and [place] laws. We do not  discriminate on the basis of race, color, national origin, age, disability, sex, sexual orientation, or gender identity.            Thank you!     Thank you for choosing [facility]  for your care. Our goal is always to provide you with excellent care. Hearing back from our patients is one way we can continue to improve our services. Please take a few minutes to complete the written survey that you may receive in the mail after your visit with us. Thank you!             Your Updated Medication List - Protect others around you: Learn how to safely use, store and throw away your medicines at www.disposemymeds.org.          This list is accurate as of: 12/12/17 11:59 PM.  Always use your most recent med list.                   Brand Name Dispense Instructions for use Diagnosis    amoxicillin 875 MG tablet    AMOXIL    10 tablet    Take 1 tablet (875 mg) by mouth 2 times daily for 5 days    OME (otitis media with effusion), left       EPINEPHrine 0.3 MG/0.3ML injection 2-pack    EPIPEN/ADRENACLICK/or ANY BX GENERIC EQUIV    2 each    Inject 0.3 mLs (0.3 mg) into the muscle once as needed    Allergic to bees       guaiFENesin-codeine 100-10 MG/5ML Soln solution    ROBITUSSIN AC    120 mL    Take 5 mLs by mouth every 4 hours as needed for cough    Cough       predniSONE 20 MG tablet    DELTASONE    10 tablet    Take 2 tablets (40 mg) by mouth daily for 5 days    OME (otitis media with effusion), left, Cough       PROBIOTIC DAILY PO      Take by mouth daily

## 2017-12-12 NOTE — NURSING NOTE
"Chief Complaint   Patient presents with     Cough     Panel Management     flu shot, tetanus, advance directive, hep c screening        Initial /70  Pulse 84  Temp 98.8  F (37.1  C) (Temporal)  Resp 18  Ht 5' 2.8\" (1.595 m)  Wt 147 lb 1.6 oz (66.7 kg)  SpO2 100%  BMI 26.23 kg/m2 Estimated body mass index is 26.23 kg/(m^2) as calculated from the following:    Height as of this encounter: 5' 2.8\" (1.595 m).    Weight as of this encounter: 147 lb 1.6 oz (66.7 kg).  Medication Reconciliation: complete     Nancy Gross MA       "

## 2017-12-12 NOTE — PROGRESS NOTES
SUBJECTIVE:                                                    Samina Lewis is a 57 year old female who presents to clinic today for the following health issues:      History of Present Illness     Diet:  Regular (no restrictions)  Frequency of exercise:  2-3 days/week  Duration of exercise:  45-60 minutes  Taking medications regularly:  Not Applicable  Medication side effects:  Not applicable      Acute Illness   Acute illness concerns: cough   Onset: ongoing for 3 months     Fever: no     Chills/Sweats: YES    Headache (location?): YES    Sinus Pressure:YES    Conjunctivitis:  no    Ear Pain: YES: left ear     Rhinorrhea: YES    Congestion: YES    Sore Throat: no      Cough: YES    Wheeze: no     Decreased Appetite: no     Nausea: no     Vomiting: no     Diarrhea:  YES    Dysuria/Freq.: no     Fatigue/Achiness: YES    Sick/Strep Exposure: no      Therapies Tried and outcome: Nyquil and Mucinex , no improvement.     Patient reports for a cough that has been ongoing for 3 months. She is also experiencing chills, a headache, sinus pressure, left ear pain, rhinorrhea, congestion, diarrhea, and fatigue. Her throat feels very dry. She has had a constant flow of clear from her nose. She cannot breath through her nose. Patient has had a fever of 100.8, but she doesn't have one today. Her glands are swollen. Her cold symptoms started on Friday, 12-. Her boss has had a similar cough. Patient has tried Nyquil and Mucinex that has not helped. She has not been sleeping well. Patient is still working 12 hour days.     Skin  Patient still has acne. She would like to see a Dermatologist.       She is not longer having issues with her kidney stone.     Problem list and histories reviewed & adjusted, as indicated.  Additional history: as documented    BP Readings from Last 3 Encounters:   12/12/17 132/70   08/18/17 119/76   07/19/17 136/84    Wt Readings from Last 3 Encounters:   12/12/17 66.7 kg (147 lb 1.6 oz)  "  07/19/17 65.3 kg (144 lb)   05/30/17 66.4 kg (146 lb 6.2 oz)         ROS:  C: NEGATIVE for change in weight. POSITIVE for fever and chills. See HPI above.   INTEGUMENTARY/SKIN: NEGATIVE for worrisome rashes, moles or lesions. See HPI above.   E/M: NEGATIVE for mouth problems. POSITIVE for ear and throat problems. See HPI above.   R: NEGATIVE for significant SOB. POSITIVE for significant cough. See HPI above.   CV: NEGATIVE for chest pain, palpitations or peripheral edema  GI: NEGATIVE for nausea, abdominal pain, and heartburn. POSITIVE change in bowel habits. See HPI above.   : normal menstrual cycles. See HPI above.   NEURO: NEGATIVE for dizziness, weakness, and paresthesias. See HPI above.     This document serves as a record of the services and decisions personally performed and made by Crystal Zafar MD. It was created on her behalf by Luci Cee, a trained medical scribe. The creation of this document is based on the provider's statements to the medical scribe.  Luci Cee 2:27 PM December 12, 2017    OBJECTIVE:     /70  Pulse 84  Temp 98.8  F (37.1  C) (Temporal)  Resp 18  Ht 1.595 m (5' 2.8\")  Wt 66.7 kg (147 lb 1.6 oz)  SpO2 100%  BMI 26.23 kg/m2  Body mass index is 26.23 kg/(m^2).  GENERAL APPEARANCE: healthy, alert and no distress  HENT: ear canals and left TM erythematous, bulging, purulent fluid noted behind TM, right side was normal and nose and mouth without ulcers or lesions, mild post oral pharynx erythema  NECK: no adenopathy, no asymmetry, masses, or scars and thyroid normal to palpation  RESP: lungs clear to auscultation - no rales, rhonchi or wheezes  CV: regular rates and rhythm, normal S1 S2, no S3 or S4 and no murmur, click or rub    Diagnostic Test Results:  none     ASSESSMENT/PLAN:           1. Cough  Discussed cough symptoms.lungs are clear on exam today. Could be due to post infectious bronchospasm.  Will treat with Robitussin -10mg and Prednisone 20mg. " Directed to take with food. Warned patient that Prednisone can make you hungry and can make sleep difficult.   Recheck if fails to improve or if worsening in any way.    - guaiFENesin-codeine (ROBITUSSIN AC) 100-10 MG/5ML SOLN solution; Take 5 mLs by mouth every 4 hours as needed for cough  Dispense: 120 mL; Refill: 0  - predniSONE (DELTASONE) 20 MG tablet; Take 2 tablets (40 mg) by mouth daily for 5 days  Dispense: 10 tablet; Refill: 0    2. OME (otitis media with effusion), left  Evaluated symptoms. Will treat OME with Amoxicillin 875mg and Prednisone 20mg.   - amoxicillin (AMOXIL) 875 MG tablet; Take 1 tablet (875 mg) by mouth 2 times daily for 5 days  Dispense: 10 tablet; Refill: 0  - predniSONE (DELTASONE) 20 MG tablet; Take 2 tablets (40 mg) by mouth daily for 5 days  Dispense: 10 tablet; Refill: 0    3. Acne, unspecified acne type  Referral to Dermatology as requested by patient.   - SKIN CARE REFERRAL    Follow Up: Return to clinic if symptoms worsen or fail to improve.     All questions invited, asked and answered to the patient's apparent satisfaction.  Patient agrees to plan.     The information in this document, created by the medical scribe for me, accurately reflects the services I personally performed and the decisions made by me. I have reviewed and approved this document for accuracy prior to leaving the patient care area.  December 12, 2017 2:27 PM    DENI BUNCH MD, MD  [facility]

## 2017-12-28 ENCOUNTER — E-VISIT (OUTPATIENT)
Dept: FAMILY MEDICINE | Facility: CLINIC | Age: 57
End: 2017-12-28
Payer: COMMERCIAL

## 2017-12-28 DIAGNOSIS — H10.33 ACUTE BACTERIAL CONJUNCTIVITIS OF BOTH EYES: Primary | ICD-10-CM

## 2017-12-28 PROCEDURE — 99444 ZZC PHYSICIAN ONLINE EVALUATION & MANAGEMENT SERVICE: CPT | Performed by: FAMILY MEDICINE

## 2017-12-28 RX ORDER — TOBRAMYCIN 3 MG/ML
1 SOLUTION/ DROPS OPHTHALMIC EVERY 4 HOURS
Qty: 1 BOTTLE | Refills: 0 | Status: SHIPPED | OUTPATIENT
Start: 2017-12-28 | End: 2018-01-04

## 2018-01-17 ENCOUNTER — OFFICE VISIT (OUTPATIENT)
Dept: FAMILY MEDICINE | Facility: CLINIC | Age: 58
End: 2018-01-17
Payer: COMMERCIAL

## 2018-01-17 DIAGNOSIS — Z12.83 SKIN CANCER SCREENING: Primary | ICD-10-CM

## 2018-01-17 DIAGNOSIS — D18.01 CAPILLARY HEMANGIOMA OF SKIN: ICD-10-CM

## 2018-01-17 DIAGNOSIS — D22.9 MULTIPLE BENIGN MELANOCYTIC NEVI: ICD-10-CM

## 2018-01-17 DIAGNOSIS — Z85.828 HISTORY OF BASAL CELL CARCINOMA: ICD-10-CM

## 2018-01-17 DIAGNOSIS — L81.4 SOLAR LENTIGO: ICD-10-CM

## 2018-01-17 DIAGNOSIS — L71.9 ROSACEA: ICD-10-CM

## 2018-01-17 PROCEDURE — 99213 OFFICE O/P EST LOW 20 MIN: CPT | Performed by: FAMILY MEDICINE

## 2018-01-17 RX ORDER — METRONIDAZOLE 7.5 MG/G
GEL TOPICAL 2 TIMES DAILY
Qty: 45 G | Refills: 11 | Status: CANCELLED | OUTPATIENT
Start: 2018-01-17

## 2018-01-17 RX ORDER — MINOCYCLINE HYDROCHLORIDE 50 MG/1
50 CAPSULE ORAL DAILY
Qty: 90 CAPSULE | Refills: 3 | Status: SHIPPED | OUTPATIENT
Start: 2018-01-17 | End: 2019-01-15

## 2018-01-17 RX ORDER — DOXYCYCLINE 50 MG/1
50 CAPSULE ORAL DAILY
Qty: 90 CAPSULE | Refills: 3 | Status: CANCELLED | OUTPATIENT
Start: 2018-01-17

## 2018-01-17 NOTE — PROGRESS NOTES
[facility] - PRIMARY CARE SKIN    CC : skin cancer screening (full-body)  SUBJECTIVE:                                                    Samina Lewis is a 57 year old female who presents to clinic today for a full-body skin exam because of her history of skin cancer.    Bothersome lesions noticed by the patient or other skin concerns :  Issue One : Generalized tingling is noted on the upper back.  Issue Two : She reports a scar following abrasion after a previous fall.    Issue Three: She also requests evaluation for acne developing on the nose after sweating. Acne typically noted only 1 day after sweating hard; these are bumps around the nose and sometimes white, pus-filled lesions. She does not try to pop the lesions, although she has used a needle tool to express purulent drainage. No burning or itching. She does report a history of rosacea. She has also a history of dry eyes.    Previous therapies include : metronidazole, minocycline, azelaic acid 20% cream, tretinoin 0.025% cream, doxycycline  Response to treatment : minocycline had controlled symptoms  Side effects : nausea, cramps, diarrhea with doxycycline 100 mg QD; diarrhea persisted upon changing to 1/2 tab BID    Aggravating factors : Sweating. She denies aggravation from alcohol or coffee consumption.    Products used : Skinceuticals. Facial moisturizer and Neutrogena facial cleanser and products used.    Personal history of skin cancer : YES - history of basal cell carcinoma on back (13-15 years ago).  Family history of skin cancer : YES - family history of basal cell carcinoma in father and grandfather.    Sun Exposure History  Previous history of significant sun exposure:  Blistering sunburns : NO  Tanning beds : YES - when younger.  Sunscreen Use : YES, frequency : in the summer, SPF : 30+.  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    Refer to electronic medical record (EMR) for past medical history and medications.    INTEGUMENTARY/SKIN: POSITIVE for acne  ROS : 14 point review of systems was negative except the symptoms listed above in the HPI.    This document serves as a record of the services and decisions personally performed and made by Rebecca Martínez MD. It was created on her behalf by Mike López, a trained medical scribe.  The creation of this document is based on the scribe's personal observations and the provider's statements to the medical scribe.  Mike López, January 17, 2018 2:38 PM      OBJECTIVE:                                                    GENERAL: healthy, alert and no distress  SKIN: Armendariz Skin Type - II.  This patient was examined from the top of the head to the bottom of the feet  including scalp, face, neck, back, chest, breasts, buttocks, both arms, both legs, both hands, both feet, all 10 fingers and all 10 toes. The dermatoscope was used to help evaluate pigmented lesions.  Skin Pertinent Findings:  Right arm(s) and left arm(s) : Multiple brown, macule(s) most consistent with benign solar lentigo. Multiple 2-4 mm in size, brown macules most consistent with benign (melanocytic nevi).    Chest, Abdomen : Scattered 2-3 mm in size, brown macules most consistent with benign (melanocytic nevi). Few slightly raised, red lesion(s) consistent with capillary hemangioma.    Anterior legs : Scattered infrequent 2-4 mm in size, brown macules most consistent with benign (melanocytic nevi).    Back : Multiple brown, macule(s) most consistent with benign solar lentigo. Scattered brown macules most consistent with benign (melanocytic nevi).     Left foot, plantar surface : 4 mm in size brown macule(s) most consistent with benign nevus(i). Dermoscopy - furrow pattern.    Significant Findings:  Face : Slight erythema across the nose and mid-portion of face.    Diagnostic  Test Results:  none           ASSESSMENT:                                                      Encounter Diagnoses   Name Primary?     Skin cancer screening Yes     History of basal cell carcinoma      Rosacea      Ocular rosacea      Solar lentigo      Multiple benign melanocytic nevi      Capillary hemangioma of skin          PLAN:                                                    Patient Instructions   FUTURE APPOINTMENTS  Follow up in 1 year(s) for a full-body skin cancer screening.    TOPICAL MEDICATION INSTRUCTIONS  Metronidazole (Metrogel) 0.75% gel.    Apply a layer to the affected area(s) on face two times per day, everyday regardless if symptoms are present or not.    Keep in mind to also regularly use moisturizer, as this preventative measure can help maintain your skin's natural moisture barrier.    Apply moisturizer after application of medication.    ORAL ANTIBIOTIC  Take by mouth doxycycline 50 mg one time(s) a day.    Avoid excessive alcohol, caffeine, chocolate, spicy food consumption or sun exposure.    SUN PROTECTION INSTRUCTIONS  Sun damage can lead to skin cancer and premature aging of the skin.      The best way to protect from sun damage to your skin is to avoid the sun during peak hours (10 am - 2 pm) even on overcast days.      Use UPF sun-protective clothing, which while more expensive initially provides longer lasting coverage without having to worry about remembering to re-apply.  1. Wear a wide-brimmed hat and sunglasses.   2. Wear sun-protective clothing.  [employer] and other pic5 make sun protective clothing that are stylish, comfortable and cool. [employer] and other pic5 make UV arm sleeves suitable for golfing, gardening and other activities.      Sunscreen instructions:  1. Use sunscreens with Zinc Oxide, Titanium Dioxide or Avobenzone to protect from UVA rays.  2. Use SPF 30-50+ to protect from UVB rays.  3. Re-apply every 2 hours even if water  "resistant.  4. Apply on your face every day even when cloudy and even in the winter. UVA \"aging rays\" penetrate window glass and are just as strong in the winter as in the summer.    Product Recommendations:    Good examples include: [place], EltaMD, Solbar    Good daily moisturizers with SPF: Vanicream, CeraVe.    For sensitive skin, consider : SkinMedica Essential Defense Mineral Shield Broad Spectrum SPF 35      Never use tanning beds. Tanning beds are associated with much higher risks of skin cancer.    All tanning damages the skin. Aim for ivory skin year round and you will have less trouble with your skin in years to come. There is no merit in getting \"a base tan\" before a warm weather vacation, as any tanning indicates your body's response to sun damage.    Stop smoking. Smokers have higher rates of skin cancer and also have premature skin wrinkling.    FYI  You should use about 3 tablespoons of sunscreen to protect your whole body. Thus a typical eight ounce bottle of sunscreen should last 4 applications. Remember, that the SPF rating is compromised if you don t apply enough. Most people only apply 1/2 - 1/3 of the amount they need. Also don t forget areas such as your ears, feet, upper back and harder to reach places. Keep in mind that these amounts should be increased for larger body sizes.    Sunscreens with titanium dioxide and/or zinc oxide in the active ingredients are physical blockers as opposed to chemical blockers. Chemical-free sunscreens should not irritate the skin.    Spray-on sunscreens may be used for touch-up application only, not as a base layer. Also, use with caution around small children due to inhalation risk.    Avoid retinyl palmitate products.    Avoid combination products that include both sunscreen and insect repellant, as sunscreen should be applied every 2 hours, but insect repellant should not be applied as frequently.    SPF means sun protection factor, which is just the " degree to which the sunscreen can protect against UVB rays. There is no rating system for UVA rays. SPF is calculated as the time skin will burn when sunscreen is applied vs. skin without sunscreen.    Water resistant sunscreens should be re-applied every 1-2 hours.    For more information:  http://www.skincancer.org/prevention/sun-protection/sunscreen/sunscreens-safe-and-effective    SKIN CANCER SELF-EXAM INSTRUCTIONS  Check every month in the mirror or with a household member. Be aware of any changes, especially bleeding or tenderness. Also, make sure to check your nails for color changes after removal of nail polish.    For melanoma, check for:  A - Asymmetry. One half unlike the other half.  B - Border. Irregular, scalloped, ragged, notched, blurred or poorly defined borders.  C - Color. Color variations from one area to another, with shades of tan, brown and/or black present. Sometimes white, red or blue.  D - Diameter. Greater than 6 mm (about the size of a pencil eraser). Any new growth of a mole should be concerning and be evaluated.  E - Evolving. A mole or skin lesion that looks different from the rest or is changing in size, shape or color.    For basal cell carcinoma and squamous cell carcinoma, check for:    Sores, shiny bumps, nodules, scaly lesions, or wart-like growths that are itchy, tender, crusting, scabbing, eroding, oozing or bleeding.    Open sores/wounds or reddish/irritated areas that do not heal within 2-3 weeks.    Scar-like areas that are white, yellow or waxy in color.    The patient was counseled about sunscreens and sun avoidance. The patient was counseled to check the skin regularly and report any lesion that is new, changing, itching, scabbing, bleeding or otherwise bothersome. The patient was discharged ambulatory and in stable condition.      PROCEDURES:                                                    None.    TT : 25 minutes.  CT : 15 minutes.      The information in this  document, created by the medical scribe for me, accurately reflects the services I personally performed and the decisions made by me. I have reviewed and approved this document for accuracy prior to leaving the patient care area.  Rebecca Martínez MD January 17, 2018 2:38 PM  [facility] - PRIMARY CARE SKIN

## 2018-01-17 NOTE — MR AVS SNAPSHOT
After Visit Summary   1/17/2018    Samina Lewis    MRN: [id number]           Patient Information     Date Of Birth          1960        Visit Information        Provider Department      1/17/2018 2:40 PM Lillie Martínez MD [facility] - Primary Care Skin        Today's Diagnoses     Skin cancer screening    -  1    History of basal cell carcinoma        Rosacea        Solar lentigo        Multiple benign melanocytic nevi        Capillary hemangioma of skin          Care Instructions    FUTURE APPOINTMENTS  Follow up in 1 year(s) for a full-body skin cancer screening.    ORAL ANTIBIOTIC  Take by mouth minocycline 50 mg one time(s) a day.  Avoid excessive alcohol, caffeine, chocolate, spicy food consumption or sun exposure.    TETRACYCLINE ANTIBIOTIC INFORMATION  Minocycline and doxycycline are tetracycline antibiotics and can increase your sun sensitivity, so make sure to be vigilant in regularly applying sunscreen. Also, avoid taking these with dairy products, as calcium can bind the antibiotic, making it unavailable to your body.    SUN PROTECTION INSTRUCTIONS  Sun damage can lead to skin cancer and premature aging of the skin.      The best way to protect from sun damage to your skin is to avoid the sun during peak hours (10 am - 2 pm) even on overcast days.      Use UPF sun-protective clothing, which while more expensive initially provides longer lasting coverage without having to worry about remembering to re-apply.  1. Wear a wide-brimmed hat and sunglasses.   2. Wear sun-protective clothing.  [employer] and other [employer] make sun protective clothing that are stylish, comfortable and cool. [employer] and other [employer] make UV arm sleeves suitable for golfing, gardening and other activities.      Sunscreen instructions:  1. Use sunscreens with Zinc Oxide, Titanium Dioxide or Avobenzone to protect from UVA rays.  2. Use SPF 30-50+ to protect from UVB  "rays.  3. Re-apply every 2 hours even if water resistant.  4. Apply on your face every day even when cloudy and even in the winter. UVA \"aging rays\" penetrate window glass and are just as strong in the winter as in the summer.    Product Recommendations:    Good examples include: [employer], EltaMD, Solbar    Good daily moisturizers with SPF: Vanicream, CeraVe.    For sensitive skin, consider : SkinMedica Essential Defense Mineral Shield Broad Spectrum SPF 35      Never use tanning beds. Tanning beds are associated with much higher risks of skin cancer.    All tanning damages the skin. Aim for ivory skin year round and you will have less trouble with your skin in years to come. There is no merit in getting \"a base tan\" before a warm weather vacation, as any tanning indicates your body's response to sun damage.    Stop smoking. Smokers have higher rates of skin cancer and also have premature skin wrinkling.    FYI  You should use about 3 tablespoons of sunscreen to protect your whole body. Thus a typical eight ounce bottle of sunscreen should last 4 applications. Remember, that the SPF rating is compromised if you don t apply enough. Most people only apply 1/2 - 1/3 of the amount they need. Also don t forget areas such as your ears, feet, upper back and harder to reach places. Keep in mind that these amounts should be increased for larger body sizes.    Sunscreens with titanium dioxide and/or zinc oxide in the active ingredients are physical blockers as opposed to chemical blockers. Chemical-free sunscreens should not irritate the skin.    Spray-on sunscreens may be used for touch-up application only, not as a base layer. Also, use with caution around small children due to inhalation risk.    Avoid retinyl palmitate products.    Avoid combination products that include both sunscreen and insect repellant, as sunscreen should be applied every 2 hours, but insect repellant should not be applied as frequently.    SPF " means sun protection factor, which is just the degree to which the sunscreen can protect against UVB rays. There is no rating system for UVA rays. SPF is calculated as the time skin will burn when sunscreen is applied vs. skin without sunscreen.    Water resistant sunscreens should be re-applied every 1-2 hours.    For more information:  http://www.skincancer.org/prevention/sun-protection/sunscreen/sunscreens-safe-and-effective    SKIN CANCER SELF-EXAM INSTRUCTIONS  Check every month in the mirror or with a household member. Be aware of any changes, especially bleeding or tenderness. Also, make sure to check your nails for color changes after removal of nail polish.    For melanoma, check for:  A - Asymmetry. One half unlike the other half.  B - Border. Irregular, scalloped, ragged, notched, blurred or poorly defined borders.  C - Color. Color variations from one area to another, with shades of tan, brown and/or black present. Sometimes white, red or blue.  D - Diameter. Greater than 6 mm (about the size of a pencil eraser). Any new growth of a mole should be concerning and be evaluated.  E - Evolving. A mole or skin lesion that looks different from the rest or is changing in size, shape or color.    For basal cell carcinoma and squamous cell carcinoma, check for:    Sores, shiny bumps, nodules, scaly lesions, or wart-like growths that are itchy, tender, crusting, scabbing, eroding, oozing or bleeding.    Open sores/wounds or reddish/irritated areas that do not heal within 2-3 weeks.    Scar-like areas that are white, yellow or waxy in color.          Follow-ups after your visit        Who to contact     If you have questions or need follow up information about today's clinic visit or your schedule please contact [facility] - PRIMARY CARE SKIN directly at [phone].  Normal or non-critical lab and imaging results will be communicated to you by MyChart, letter or phone within 4 business days after the clinic  has received the results. If you do not hear from us within 7 days, please contact the clinic through [employer]LM or phone. If you have a critical or abnormal lab result, we will notify you by phone as soon as possible.  Submit refill requests through [employer] or call your pharmacy and they will forward the refill request to us. Please allow 3 business days for your refill to be completed.          Additional Information About Your Visit        Gigalocal[employer]Musistic Information     [employer] gives you secure access to your electronic health record. If you see a primary care provider, you can also send messages to your care team and make appointments. If you have questions, please call your primary care clinic.  If you do not have a primary care provider, please call [phone] and they will assist you.        Care EveryWhere ID     This is your Care EveryWhere ID. This could be used by other organizations to access your [place] medical records  [id number]         Blood Pressure from Last 3 Encounters:   12/12/17 132/70   08/18/17 119/76   07/19/17 136/84    Weight from Last 3 Encounters:   12/12/17 147 lb 1.6 oz (66.7 kg)   07/19/17 144 lb (65.3 kg)   05/30/17 146 lb 6.2 oz (66.4 kg)              Today, you had the following     No orders found for display         Today's Medication Changes          These changes are accurate as of: 1/17/18  2:59 PM.  If you have any questions, ask your nurse or doctor.               Start taking these medicines.        Dose/Directions    minocycline 50 MG capsule   Commonly known as:  MINOCIN/DYNACIN   Used for:  Rosacea        Dose:  50 mg   Take 1 capsule (50 mg) by mouth daily   Quantity:  90 capsule   Refills:  3            Where to get your medicines      These medications were sent to [facility] - [place], [place] - [address], [address]  [address], [address], [facility] [address]     Phone:  [phone]     minocycline 50 MG capsule                Primary  Care Provider Office Phone # Fax #    Crystal Zafar [phone] [address] [facility] [address]        Equal Access to Services     ROBERTOMANJULA DEANGELO : Hadii pelon cartwright matthew Phan, waaxda luqadaha, qaybta kaalmada jenelle, flip jadebenjamin gisele. So [facility] [phone].    ATENCIÓN: Si habla español, tiene a hanley disposición servicios gratuitos de asistencia lingüística. Llame al [phone].    We comply with applicable federal civil rights laws and [place] laws. We do not discriminate on the basis of race, color, national origin, age, disability, sex, sexual orientation, or gender identity.            Thank you!     Thank you for choosing [facility] - PRIMARY CARE [facility]  for your care. Our goal is always to provide you with excellent care. Hearing back from our patients is one way we can continue to improve our services. Please take a few minutes to complete the written survey that you may receive in the mail after your visit with us. Thank you!             Your Updated Medication List - Protect others around you: Learn how to safely use, store and throw away your medicines at www.disposemymeds.org.          This list is accurate as of: 1/17/18  2:59 PM.  Always use your most recent med list.                   Brand Name Dispense Instructions for use Diagnosis    EPINEPHrine 0.3 MG/0.3ML injection 2-pack    EPIPEN/ADRENACLICK/or ANY BX GENERIC EQUIV    2 each    Inject 0.3 mLs (0.3 mg) into the muscle once as needed    Allergic to bees       guaiFENesin-codeine 100-10 MG/5ML Soln solution    ROBITUSSIN AC    120 mL    Take 5 mLs by mouth every 4 hours as needed for cough    Cough       minocycline 50 MG capsule    MINOCIN/DYNACIN    90 capsule    Take 1 capsule (50 mg) by mouth daily    Rosacea       PROBIOTIC DAILY PO      Take by mouth daily

## 2018-01-17 NOTE — PATIENT INSTRUCTIONS
"FUTURE APPOINTMENTS  Follow up in 1 year(s) for a full-body skin cancer screening.    ORAL ANTIBIOTIC  Take by mouth minocycline 50 mg one time(s) a day.  Avoid excessive alcohol, caffeine, chocolate, spicy food consumption or sun exposure.    TETRACYCLINE ANTIBIOTIC INFORMATION  Minocycline and doxycycline are tetracycline antibiotics and can increase your sun sensitivity, so make sure to be vigilant in regularly applying sunscreen. Also, avoid taking these with dairy products, as calcium can bind the antibiotic, making it unavailable to your body.    SUN PROTECTION INSTRUCTIONS  Sun damage can lead to skin cancer and premature aging of the skin.      The best way to protect from sun damage to your skin is to avoid the sun during peak hours (10 am - 2 pm) even on overcast days.      Use UPF sun-protective clothing, which while more expensive initially provides longer lasting coverage without having to worry about remembering to re-apply.  1. Wear a wide-brimmed hat and sunglasses.   2. Wear sun-protective clothing.  [employer] and other [employer] make sun protective clothing that are stylish, comfortable and cool. [employer] and other [employer] make UV arm sleeves suitable for golfing, gardening and other activities.      Sunscreen instructions:  1. Use sunscreens with Zinc Oxide, Titanium Dioxide or Avobenzone to protect from UVA rays.  2. Use SPF 30-50+ to protect from UVB rays.  3. Re-apply every 2 hours even if water resistant.  4. Apply on your face every day even when cloudy and even in the winter. UVA \"aging rays\" penetrate window glass and are just as strong in the winter as in the summer.    Product Recommendations:    Good examples include: [place], EltaMD, Solbar    Good daily moisturizers with SPF: Vanicream, CeraVe.    For sensitive skin, consider : Skin[employer] Essential Defense Mineral Shield Broad Spectrum SPF 35      Never use tanning beds. Tanning beds are associated with much higher " "risks of skin cancer.    All tanning damages the skin. Aim for ivory skin year round and you will have less trouble with your skin in years to come. There is no merit in getting \"a base tan\" before a warm weather vacation, as any tanning indicates your body's response to sun damage.    Stop smoking. Smokers have higher rates of skin cancer and also have premature skin wrinkling.    FYI  You should use about 3 tablespoons of sunscreen to protect your whole body. Thus a typical eight ounce bottle of sunscreen should last 4 applications. Remember, that the SPF rating is compromised if you don t apply enough. Most people only apply 1/2 - 1/3 of the amount they need. Also don t forget areas such as your ears, feet, upper back and harder to reach places. Keep in mind that these amounts should be increased for larger body sizes.    Sunscreens with titanium dioxide and/or zinc oxide in the active ingredients are physical blockers as opposed to chemical blockers. Chemical-free sunscreens should not irritate the skin.    Spray-on sunscreens may be used for touch-up application only, not as a base layer. Also, use with caution around small children due to inhalation risk.    Avoid retinyl palmitate products.    Avoid combination products that include both sunscreen and insect repellant, as sunscreen should be applied every 2 hours, but insect repellant should not be applied as frequently.    SPF means sun protection factor, which is just the degree to which the sunscreen can protect against UVB rays. There is no rating system for UVA rays. SPF is calculated as the time skin will burn when sunscreen is applied vs. skin without sunscreen.    Water resistant sunscreens should be re-applied every 1-2 hours.    For more information:  http://www.skincancer.org/prevention/sun-protection/sunscreen/sunscreens-safe-and-effective    SKIN CANCER SELF-EXAM INSTRUCTIONS  Check every month in the mirror or with a household member. Be aware " of any changes, especially bleeding or tenderness. Also, make sure to check your nails for color changes after removal of nail polish.    For melanoma, check for:  A - Asymmetry. One half unlike the other half.  B - Border. Irregular, scalloped, ragged, notched, blurred or poorly defined borders.  C - Color. Color variations from one area to another, with shades of tan, brown and/or black present. Sometimes white, red or blue.  D - Diameter. Greater than 6 mm (about the size of a pencil eraser). Any new growth of a mole should be concerning and be evaluated.  E - Evolving. A mole or skin lesion that looks different from the rest or is changing in size, shape or color.    For basal cell carcinoma and squamous cell carcinoma, check for:    Sores, shiny bumps, nodules, scaly lesions, or wart-like growths that are itchy, tender, crusting, scabbing, eroding, oozing or bleeding.    Open sores/wounds or reddish/irritated areas that do not heal within 2-3 weeks.    Scar-like areas that are white, yellow or waxy in color.

## 2018-10-11 DIAGNOSIS — Z12.31 VISIT FOR SCREENING MAMMOGRAM: ICD-10-CM

## 2018-11-12 ENCOUNTER — HEALTH MAINTENANCE LETTER (OUTPATIENT)
Age: 58
End: 2018-11-12

## 2018-11-19 ENCOUNTER — TELEPHONE (OUTPATIENT)
Dept: FAMILY MEDICINE | Facility: CLINIC | Age: 58
End: 2018-11-19

## 2018-11-19 NOTE — TELEPHONE ENCOUNTER
Summary:    Patient is due/failing the following:   PAP    Action needed:   Patient needs office visit for PAP.    Type of outreach:    Phone, left message for patient to call back.     Questions for provider review:    None                                                                                                                                    Diane Jay       Chart routed to Care Team .  Panel Management Review      Patient has the following on her problem list: None      Composite cancer screening  Chart review shows that this patient is due/due soon for the following Pap Smear

## 2018-11-20 NOTE — TELEPHONE ENCOUNTER
Patient returned call and was given information below, patient states she will call back to schedule.    Thank you Samina

## 2019-01-15 ENCOUNTER — TELEPHONE (OUTPATIENT)
Dept: FAMILY MEDICINE | Facility: CLINIC | Age: 59
End: 2019-01-15

## 2019-01-15 DIAGNOSIS — Z13.220 LIPID SCREENING: Primary | ICD-10-CM

## 2019-01-15 DIAGNOSIS — L71.9 ROSACEA: ICD-10-CM

## 2019-01-15 DIAGNOSIS — Z13.0 SCREENING, ANEMIA, DEFICIENCY, IRON: ICD-10-CM

## 2019-01-15 DIAGNOSIS — Z13.1 SCREENING FOR DIABETES MELLITUS: ICD-10-CM

## 2019-01-15 RX ORDER — MINOCYCLINE HYDROCHLORIDE 50 MG/1
50 CAPSULE ORAL DAILY
Qty: 90 CAPSULE | Refills: 3 | Status: SHIPPED | OUTPATIENT
Start: 2019-01-15 | End: 2019-02-05

## 2019-01-15 NOTE — TELEPHONE ENCOUNTER
Pt informed.      Provider, can you please send a refill for minocycline (MINOCIN/DYNACIN) 50 MG capsule to the pharmacy for pt. (her appt was cancelled for today)

## 2019-01-15 NOTE — TELEPHONE ENCOUNTER
Reason for Call:  Other Labs    Detailed comments: Patient would like to have fasting labs done before appt, patient would like to go to [facility] to have these done.     Please update patient once labs are ordered so patient can schedule with [email or web address]     Phone Number Patient can be reached at: Cell number on file:    Telephone Information:   Mobile [phone]       Best Time: Anytime     Can we leave a detailed message on this number? YES    Call taken on 1/15/2019 at 7:07 AM by Esperanza Morse

## 2019-01-15 NOTE — TELEPHONE ENCOUNTER
Orders placed.   Pls change the code to [phone] from [id number]. Thanks. I filled out the self pay bill form but billed it incorrectly.

## 2019-01-28 NOTE — PROGRESS NOTES
"   SUBJECTIVE:   CC: Samina Lewis is an 58 year old woman who presents for preventive health visit.     Physical   Annual:     Getting at least 3 servings of Calcium per day:  Yes    Bi-annual eye exam:  Yes    Dental care twice a year:  Yes    Sleep apnea or symptoms of sleep apnea:  None    Diet:  Regular (no restrictions)    Frequency of exercise:  4-5 days/week    Duration of exercise:  30-45 minutes    Taking medications regularly:  Yes    Medication side effects:  None    Additional concerns today:  No    PHQ-2 Total Score: 0      Rosacea  Patient is doing well with current medication. This was refilled recently. She has no concerns.     Bruising  She has noted bruising on her legs that doesn't seem to heal. She has \"always been that way\".     Urine incontinence  Patient has noted rare occurrence of urine incontinence. Usually happens with a laugh. She is able to exercise without issue. She has no major concerns at this time. She is noticing vaginal dryness.       Today's PHQ-2 Score:   PHQ-2 ( 1999 Pfizer) 2/4/2019   Q1: Little interest or pleasure in doing things 0   Q2: Feeling down, depressed or hopeless 0   PHQ-2 Score 0   Q1: Little interest or pleasure in doing things Not at all   Q2: Feeling down, depressed or hopeless Not at all   PHQ-2 Score 0       Abuse: Current or Past(Physical, Sexual or Emotional)- No  Do you feel safe in your environment? Yes    Social History     Tobacco Use     Smoking status: Never Smoker     Smokeless tobacco: Never Used   Substance Use Topics     Alcohol use: Yes     Comment: 3 drinks/week   Screening Questionnaire for Adult Immunization    Are you sick today?   No   Do you have allergies to medications, food, a vaccine component or latex?   No   Have you ever had a serious reaction after receiving a vaccination?   No   Do you have a long-term health problem with heart disease, lung disease, asthma, kidney disease, metabolic disease (e.g. diabetes), anemia, or other " blood disorder?   No   Do you have cancer, leukemia, HIV/AIDS, or any other immune system problem?   No   In the past 3 months, have you taken medications that affect  your immune system, such as prednisone, other steroids, or anticancer drugs; drugs for the treatment of rheumatoid arthritis, Crohn s disease, or psoriasis; or have you had radiation treatments?   No   Have you had a seizure, or a brain or other nervous system problem?   No   During the past year, have you received a transfusion of blood or blood     products, or been given immune (gamma) globulin or antiviral drug?   No   For women: Are you pregnant or is there a chance you could become        pregnant during the next month?   No   Have you received any vaccinations in the past 4 weeks?   No     Immunization questionnaire answers were all negative.        Per orders of Dr. Zafar, injection of TD given by Nancy Gross. Patient instructed to remain in clinic for 15 minutes afterwards, and to report any adverse reaction to me immediately.       Screening performed by Nancy Gross on 2/5/2019 at 4:17 PM.      Reviewed orders with patient.  Reviewed health maintenance and updated orders accordingly - Yes  BP Readings from Last 3 Encounters:   02/05/19 114/72   12/12/17 132/70   08/18/17 119/76    Wt Readings from Last 3 Encounters:   02/05/19 64.6 kg (142 lb 8 oz)   12/12/17 66.7 kg (147 lb 1.6 oz)   07/19/17 65.3 kg (144 lb)                  Patient Active Problem List   Diagnosis     Basal cell carcinoma of skin     Urinary calculus     CARDIOVASCULAR SCREENING; LDL GOAL LESS THAN 160     Left knee pain     Allergic to bees     Easy bruising     Rosacea     Pulmonary nodule, right     History of basal cell carcinoma     Past Surgical History:   Procedure Laterality Date     COLONOSCOPY  2009     DIL URETHRA,FEMALE,INITIAL  1996     HC ENLARGE BREAST WITH IMPLANT  1989     TONSILLECTOMY  1965       Social History     Tobacco Use     Smoking status:  Never Smoker     Smokeless tobacco: Never Used   Substance Use Topics     Alcohol use: Yes     Comment: 3 drinks/week     Family History   Problem Relation Age of Onset     Lipids Mother      Hypertension Mother      Hyperlipidemia Mother      Osteoporosis Mother      Heart Disease Maternal Grandmother      Hypertension Maternal Grandmother      Hyperlipidemia Maternal Grandmother      Cerebrovascular Disease Maternal Grandfather      Cancer Paternal Grandmother         Basal cell carcinoma     Alzheimer Disease Paternal Grandmother      Breast Cancer Paternal Grandmother         2 times     Heart Disease Paternal Grandfather      Respiratory Paternal Grandfather      Cancer - colorectal Paternal Aunt         70s     Cancer - colorectal Maternal Uncle         50 years old found on screening colonoscopy     Breast Cancer Cousin      Breast Cancer Other            Mammogram Screening: Patient over age 50, mutual decision to screen reflected in health maintenance.    Pertinent mammograms are reviewed under the imaging tab.  History of abnormal Pap smear: NO - age 30-65 PAP every 5 years with negative HPV co-testing recommended  PAP / HPV 9/9/2015 7/11/2012 9/10/2009   PAP NIL NIL NIL     Reviewed and updated as needed this visit by clinical staff  Tobacco  Allergies  Meds  Med Hx  Surg Hx  Fam Hx  Soc Hx        Reviewed and updated as needed this visit by Provider            Review of Systems   Constitutional: Negative for chills and fever.   HENT: Negative for congestion, ear pain, hearing loss and sore throat.    Eyes: Negative for pain and visual disturbance.   Respiratory: Negative for cough and shortness of breath.    Cardiovascular: Negative for chest pain, palpitations and peripheral edema.   Gastrointestinal: Negative for abdominal pain, constipation, diarrhea, heartburn, hematochezia and nausea.   Breasts:  Negative for tenderness, breast mass and discharge.   Genitourinary: Negative for dysuria,  "frequency, genital sores, hematuria, pelvic pain, urgency, vaginal bleeding and vaginal discharge.   Musculoskeletal: Negative for arthralgias, joint swelling and myalgias.   Skin: Negative for rash.   Neurological: Negative for dizziness, weakness, headaches and paresthesias.   Psychiatric/Behavioral: Negative for mood changes. The patient is not nervous/anxious.            OBJECTIVE:   /72   Pulse 68   Temp 97.8  F (36.6  C) (Oral)   Resp 16   Ht 1.6 m (5' 3\")   Wt 64.6 kg (142 lb 8 oz)   LMP  (LMP Unknown)   SpO2 98%   Breastfeeding? No   BMI 25.24 kg/m    Physical Exam  GENERAL APPEARANCE: healthy, alert and no distress  EYES: Eyes grossly normal to inspection, PERRL and conjunctivae and sclerae normal  HENT: ear canals and TM's normal, nose and mouth without ulcers or lesions, oropharynx clear and oral mucous membranes moist  NECK: no adenopathy, no asymmetry, masses, or scars and thyroid normal to palpation  RESP: lungs clear to auscultation - no rales, rhonchi or wheezes  BREAST: normal without masses, tenderness or nipple discharge and no palpable axillary masses or adenopathy  CV: regular rate and rhythm, normal S1 S2, no S3 or S4, no murmur, click or rub, no peripheral edema and peripheral pulses strong  ABDOMEN: soft, nontender, no hepatosplenomegaly, no masses and bowel sounds normal   (female): normal female external genitalia, normal urethral meatus, vaginal mucosal atrophy noted, normal cervix with small polyp noted at os - slight bleeding with pap, adnexae, and uterus without masses or abnormal discharge  MS: no musculoskeletal defects are noted and gait is age appropriate without ataxia  SKIN: no suspicious lesions or rashes  NEURO: Normal strength and tone, sensory exam grossly normal, mentation intact and speech normal  PSYCH: mentation appears normal and affect normal/bright    Diagnostic Test Results:  none     ASSESSMENT/PLAN:   1. Encounter for routine adult health examination " "without abnormal findings  Pap done. Will contact with results when available.      2. Leukopenia, unspecified type  Last cbc showed mildly low wbc.   Recommend recheck in about 2 months.   She has felt otherwise well. No concerns.   - **CBC with platelets FUTURE 2mo; Future    3. Rosacea  Doing well. Well controlled. Tolerating medication.  No change in plan.      4. Screening for malignant neoplasm of cervix  Will notify of results.   - Pap imaged thin layer screen with HPV - recommended age 30 - 65 years (select HPV order below)  - HPV High Risk Types DNA Cervical    5. Need for hepatitis C screening test  Will have done when returns for cbc.   - Hepatitis C Screen Reflex to HCV RNA Quant and Genotype; Future    6. Screening for HIV (human immunodeficiency virus)  Declines.    7. Need for vaccination  Td updated.   - 1st  Administration  [83478]  - TD PRSERV FREE >=7 YRS ADS IM [50353]    8. Stress urinary incontinence  Discussed. Doesn't feel it is too much of an issue currently.   Will consider if intervention needed if increasing.   Discussed trial of estrogen cream due to dryness.   Discussed her weight management may help.   All questions invited, asked and answered to the patient's apparent satisfaction.  Patient agrees to plan.      COUNSELING:  Reviewed preventive health counseling, as reflected in patient instructions  Special attention given to:        Regular exercise       Healthy diet/nutrition       HIV screeninx in teen years, 1x in adult years, and at intervals if high risk       (Georgina)menopause management    BP Readings from Last 1 Encounters:   19 114/72     Estimated body mass index is 25.24 kg/m  as calculated from the following:    Height as of this encounter: 1.6 m (5' 3\").    Weight as of this encounter: 64.6 kg (142 lb 8 oz).      Weight management plan: Discussed healthy diet and exercise guidelines     reports that  has never smoked. she has never used smokeless " tobacco.      Counseling Resources:  ATP IV Guidelines  Pooled Cohorts Equation Calculator  Breast Cancer Risk Calculator  FRAX Risk Assessment  ICSI Preventive Guidelines  Dietary Guidelines for Americans, 2010  [employer]'s MyPlate  ASA Prophylaxis  Lung CA Screening    DENI BUNCH MD, MD  [facility]

## 2019-01-29 DIAGNOSIS — Z13.0 SCREENING, ANEMIA, DEFICIENCY, IRON: ICD-10-CM

## 2019-01-29 DIAGNOSIS — Z13.220 LIPID SCREENING: ICD-10-CM

## 2019-01-29 DIAGNOSIS — Z13.1 SCREENING FOR DIABETES MELLITUS: ICD-10-CM

## 2019-01-29 LAB
ALBUMIN SERPL-MCNC: 4.2 G/DL (ref 3.4–5)
ALP SERPL-CCNC: 52 U/L (ref 40–150)
ALT SERPL W P-5'-P-CCNC: 21 U/L (ref 0–50)
ANION GAP SERPL CALCULATED.3IONS-SCNC: 8 MMOL/L (ref 3–14)
AST SERPL W P-5'-P-CCNC: 18 U/L (ref 0–45)
BASOPHILS # BLD AUTO: 0 10E9/L (ref 0–0.2)
BASOPHILS NFR BLD AUTO: 0.3 %
BILIRUB SERPL-MCNC: 0.6 MG/DL (ref 0.2–1.3)
BUN SERPL-MCNC: 20 MG/DL (ref 7–30)
CALCIUM SERPL-MCNC: 9.2 MG/DL (ref 8.5–10.1)
CHLORIDE SERPL-SCNC: 108 MMOL/L (ref 94–109)
CHOLEST SERPL-MCNC: 199 MG/DL
CO2 SERPL-SCNC: 26 MMOL/L (ref 20–32)
CREAT SERPL-MCNC: 0.92 MG/DL (ref 0.52–1.04)
DIFFERENTIAL METHOD BLD: ABNORMAL
EOSINOPHIL # BLD AUTO: 0.1 10E9/L (ref 0–0.7)
EOSINOPHIL NFR BLD AUTO: 1.8 %
ERYTHROCYTE [DISTWIDTH] IN BLOOD BY AUTOMATED COUNT: 12.9 % (ref 10–15)
GFR SERPL CREATININE-BSD FRML MDRD: 69 ML/MIN/{1.73_M2}
GLUCOSE SERPL-MCNC: 95 MG/DL (ref 70–99)
HCT VFR BLD AUTO: 42.7 % (ref 35–47)
HDLC SERPL-MCNC: 63 MG/DL
HGB BLD-MCNC: 14.1 G/DL (ref 11.7–15.7)
LDLC SERPL CALC-MCNC: 118 MG/DL
LYMPHOCYTES # BLD AUTO: 1.1 10E9/L (ref 0.8–5.3)
LYMPHOCYTES NFR BLD AUTO: 28.3 %
MCH RBC QN AUTO: 30 PG (ref 26.5–33)
MCHC RBC AUTO-ENTMCNC: 33 G/DL (ref 31.5–36.5)
MCV RBC AUTO: 91 FL (ref 78–100)
MONOCYTES # BLD AUTO: 0.4 10E9/L (ref 0–1.3)
MONOCYTES NFR BLD AUTO: 9.7 %
NEUTROPHILS # BLD AUTO: 2.3 10E9/L (ref 1.6–8.3)
NEUTROPHILS NFR BLD AUTO: 59.9 %
NONHDLC SERPL-MCNC: 136 MG/DL
PLATELET # BLD AUTO: 161 10E9/L (ref 150–450)
POTASSIUM SERPL-SCNC: 4.5 MMOL/L (ref 3.4–5.3)
PROT SERPL-MCNC: 7.1 G/DL (ref 6.8–8.8)
RBC # BLD AUTO: 4.7 10E12/L (ref 3.8–5.2)
SODIUM SERPL-SCNC: 142 MMOL/L (ref 133–144)
TRIGL SERPL-MCNC: 91 MG/DL
WBC # BLD AUTO: 3.8 10E9/L (ref 4–11)

## 2019-01-29 PROCEDURE — 85025 COMPLETE CBC W/AUTO DIFF WBC: CPT | Performed by: FAMILY MEDICINE

## 2019-01-29 PROCEDURE — 80061 LIPID PANEL: CPT | Performed by: FAMILY MEDICINE

## 2019-01-29 PROCEDURE — 80053 COMPREHEN METABOLIC PANEL: CPT | Performed by: FAMILY MEDICINE

## 2019-01-29 PROCEDURE — 36415 COLL VENOUS BLD VENIPUNCTURE: CPT | Performed by: FAMILY MEDICINE

## 2019-02-04 ASSESSMENT — ENCOUNTER SYMPTOMS
NAUSEA: 0
SORE THROAT: 0
DIZZINESS: 0
NERVOUS/ANXIOUS: 0
PALPITATIONS: 0
HEARTBURN: 0
EYE PAIN: 0
PARESTHESIAS: 0
DIARRHEA: 0
FEVER: 0
COUGH: 0
HEMATOCHEZIA: 0
SHORTNESS OF BREATH: 0
BREAST MASS: 0
WEAKNESS: 0
ABDOMINAL PAIN: 0
FREQUENCY: 0
HEADACHES: 0
HEMATURIA: 0
JOINT SWELLING: 0
MYALGIAS: 0
ARTHRALGIAS: 0
CONSTIPATION: 0
DYSURIA: 0
CHILLS: 0

## 2019-02-05 ENCOUNTER — OFFICE VISIT (OUTPATIENT)
Dept: FAMILY MEDICINE | Facility: CLINIC | Age: 59
End: 2019-02-05
Payer: COMMERCIAL

## 2019-02-05 VITALS
WEIGHT: 142.5 LBS | OXYGEN SATURATION: 98 % | BODY MASS INDEX: 25.25 KG/M2 | HEART RATE: 68 BPM | SYSTOLIC BLOOD PRESSURE: 114 MMHG | TEMPERATURE: 97.8 F | RESPIRATION RATE: 16 BRPM | DIASTOLIC BLOOD PRESSURE: 72 MMHG | HEIGHT: 63 IN

## 2019-02-05 DIAGNOSIS — Z11.59 NEED FOR HEPATITIS C SCREENING TEST: ICD-10-CM

## 2019-02-05 DIAGNOSIS — Z11.4 SCREENING FOR HIV (HUMAN IMMUNODEFICIENCY VIRUS): ICD-10-CM

## 2019-02-05 DIAGNOSIS — L71.9 ROSACEA: ICD-10-CM

## 2019-02-05 DIAGNOSIS — Z00.00 ENCOUNTER FOR ROUTINE ADULT HEALTH EXAMINATION WITHOUT ABNORMAL FINDINGS: Primary | ICD-10-CM

## 2019-02-05 DIAGNOSIS — N39.3 URINARY, INCONTINENCE, STRESS FEMALE: ICD-10-CM

## 2019-02-05 DIAGNOSIS — Z23 NEED FOR VACCINATION: ICD-10-CM

## 2019-02-05 DIAGNOSIS — Z12.4 SCREENING FOR MALIGNANT NEOPLASM OF CERVIX: ICD-10-CM

## 2019-02-05 DIAGNOSIS — D72.819 LEUKOPENIA, UNSPECIFIED TYPE: ICD-10-CM

## 2019-02-05 PROCEDURE — 90714 TD VACC NO PRESV 7 YRS+ IM: CPT | Performed by: FAMILY MEDICINE

## 2019-02-05 PROCEDURE — G0145 SCR C/V CYTO,THINLAYER,RESCR: HCPCS | Performed by: FAMILY MEDICINE

## 2019-02-05 PROCEDURE — 99396 PREV VISIT EST AGE 40-64: CPT | Mod: 25 | Performed by: FAMILY MEDICINE

## 2019-02-05 PROCEDURE — 87624 HPV HI-RISK TYP POOLED RSLT: CPT | Performed by: FAMILY MEDICINE

## 2019-02-05 PROCEDURE — 90471 IMMUNIZATION ADMIN: CPT | Performed by: FAMILY MEDICINE

## 2019-02-05 RX ORDER — MINOCYCLINE HYDROCHLORIDE 50 MG/1
CAPSULE ORAL
COMMUNITY
Start: 2019-01-15 | End: 2019-12-31

## 2019-02-05 ASSESSMENT — ENCOUNTER SYMPTOMS
PARESTHESIAS: 0
DIARRHEA: 0
PALPITATIONS: 0
WEAKNESS: 0
NERVOUS/ANXIOUS: 0
ARTHRALGIAS: 0
DIZZINESS: 0
JOINT SWELLING: 0
COUGH: 0
HEMATOCHEZIA: 0
FEVER: 0
CHILLS: 0
HEMATURIA: 0
CONSTIPATION: 0
HEADACHES: 0
EYE PAIN: 0
ABDOMINAL PAIN: 0
FREQUENCY: 0
SHORTNESS OF BREATH: 0
BREAST MASS: 0
SORE THROAT: 0
MYALGIAS: 0
DYSURIA: 0
HEARTBURN: 0
NAUSEA: 0

## 2019-02-05 ASSESSMENT — PAIN SCALES - GENERAL: PAINLEVEL: NO PAIN (0)

## 2019-02-05 ASSESSMENT — MIFFLIN-ST. JEOR: SCORE: 1200.51

## 2019-02-12 LAB
COPATH REPORT: NORMAL
PAP: NORMAL

## 2019-02-14 LAB
FINAL DIAGNOSIS: NORMAL
HPV HR 12 DNA CVX QL NAA+PROBE: NEGATIVE
HPV16 DNA SPEC QL NAA+PROBE: NEGATIVE
HPV18 DNA SPEC QL NAA+PROBE: NEGATIVE
SPECIMEN DESCRIPTION: NORMAL
SPECIMEN SOURCE CVX/VAG CYTO: NORMAL

## 2019-03-13 DIAGNOSIS — D72.819 LEUKOPENIA, UNSPECIFIED TYPE: ICD-10-CM

## 2019-03-13 DIAGNOSIS — Z11.59 NEED FOR HEPATITIS C SCREENING TEST: ICD-10-CM

## 2019-03-13 LAB
ERYTHROCYTE [DISTWIDTH] IN BLOOD BY AUTOMATED COUNT: 13.1 % (ref 10–15)
HCT VFR BLD AUTO: 40.2 % (ref 35–47)
HGB BLD-MCNC: 13.2 G/DL (ref 11.7–15.7)
MCH RBC QN AUTO: 29.9 PG (ref 26.5–33)
MCHC RBC AUTO-ENTMCNC: 32.8 G/DL (ref 31.5–36.5)
MCV RBC AUTO: 91 FL (ref 78–100)
PLATELET # BLD AUTO: 158 10E9/L (ref 150–450)
RBC # BLD AUTO: 4.42 10E12/L (ref 3.8–5.2)
WBC # BLD AUTO: 3.3 10E9/L (ref 4–11)

## 2019-03-13 PROCEDURE — 86803 HEPATITIS C AB TEST: CPT | Performed by: FAMILY MEDICINE

## 2019-03-13 PROCEDURE — 36415 COLL VENOUS BLD VENIPUNCTURE: CPT | Performed by: FAMILY MEDICINE

## 2019-03-13 PROCEDURE — 87522 HEPATITIS C REVRS TRNSCRPJ: CPT | Performed by: FAMILY MEDICINE

## 2019-03-13 PROCEDURE — 85027 COMPLETE CBC AUTOMATED: CPT | Performed by: FAMILY MEDICINE

## 2019-03-14 DIAGNOSIS — D72.819 LEUKOPENIA, UNSPECIFIED TYPE: Primary | ICD-10-CM

## 2019-03-15 ENCOUNTER — HOSPITAL ENCOUNTER (OUTPATIENT)
Facility: CLINIC | Age: 59
Setting detail: SPECIMEN
Discharge: HOME | End: 2019-03-15
Attending: FAMILY MEDICINE | Admitting: FAMILY MEDICINE
Payer: COMMERCIAL

## 2019-03-15 DIAGNOSIS — D72.819 LEUKOPENIA, UNSPECIFIED TYPE: ICD-10-CM

## 2019-03-15 LAB
RETICS # AUTO: 35.6 10E9/L (ref 25–95)
RETICS/RBC NFR AUTO: 0.8 % (ref 0.5–2)

## 2019-03-15 PROCEDURE — 40000847 ZZHCL STATISTIC MORPHOLOGY W/INTERP HISTOLOGY TC 85060: Performed by: FAMILY MEDICINE

## 2019-03-15 PROCEDURE — 85045 AUTOMATED RETICULOCYTE COUNT: CPT | Performed by: FAMILY MEDICINE

## 2019-03-15 PROCEDURE — 85060 BLOOD SMEAR INTERPRETATION: CPT | Performed by: FAMILY MEDICINE

## 2019-03-15 PROCEDURE — 36415 COLL VENOUS BLD VENIPUNCTURE: CPT | Performed by: FAMILY MEDICINE

## 2019-03-15 PROCEDURE — 85025 COMPLETE CBC W/AUTO DIFF WBC: CPT | Performed by: FAMILY MEDICINE

## 2019-03-17 LAB
HCV AB SERPL QL IA: REACTIVE
HCV RNA SERPL NAA+PROBE-ACNC: NORMAL [IU]/ML
HCV RNA SERPL NAA+PROBE-LOG IU: NORMAL LOG IU/ML

## 2019-03-18 ENCOUNTER — MYC MEDICAL ADVICE (OUTPATIENT)
Dept: FAMILY MEDICINE | Facility: CLINIC | Age: 59
End: 2019-03-18

## 2019-03-19 LAB — COPATH REPORT: NORMAL

## 2019-03-21 LAB
BASOPHILS # BLD AUTO: 0.1 10E9/L (ref 0–0.2)
DIFFERENTIAL METHOD BLD: ABNORMAL
EOSINOPHIL # BLD AUTO: 0.1 10E9/L (ref 0–0.7)
ERYTHROCYTE [DISTWIDTH] IN BLOOD BY AUTOMATED COUNT: 13.1 % (ref 10–15)
HCT VFR BLD AUTO: 40.5 % (ref 35–47)
HGB BLD-MCNC: 13.3 G/DL (ref 11.7–15.7)
LYMPHOCYTES # BLD AUTO: 1.1 10E9/L (ref 0.8–5.3)
MCH RBC QN AUTO: 29.7 PG (ref 26.5–33)
MCHC RBC AUTO-ENTMCNC: 32.8 G/DL (ref 31.5–36.5)
MCV RBC AUTO: 90 FL (ref 78–100)
MONOCYTES # BLD AUTO: 0.1 10E9/L (ref 0–1.3)
NEUTROPHILS # BLD AUTO: 1.6 10E9/L (ref 1.6–8.3)
PLATELET # BLD AUTO: 144 10E9/L (ref 150–450)
RBC # BLD AUTO: 4.48 10E12/L (ref 3.8–5.2)
WBC # BLD AUTO: 3 10E9/L (ref 4–11)

## 2019-03-26 ENCOUNTER — MYC MEDICAL ADVICE (OUTPATIENT)
Dept: FAMILY MEDICINE | Facility: CLINIC | Age: 59
End: 2019-03-26

## 2019-06-07 ENCOUNTER — DOCUMENTATION ONLY (OUTPATIENT)
Dept: LAB | Facility: CLINIC | Age: 59
End: 2019-06-07

## 2019-06-07 DIAGNOSIS — D72.9 ABNORMAL WHITE BLOOD CELL COUNT: ICD-10-CM

## 2019-06-07 DIAGNOSIS — D72.9 ABNORMAL WHITE BLOOD CELL COUNT: Primary | ICD-10-CM

## 2019-06-07 PROCEDURE — 85027 COMPLETE CBC AUTOMATED: CPT | Performed by: FAMILY MEDICINE

## 2019-06-07 PROCEDURE — 36415 COLL VENOUS BLD VENIPUNCTURE: CPT | Performed by: FAMILY MEDICINE

## 2019-06-10 LAB
ERYTHROCYTE [DISTWIDTH] IN BLOOD BY AUTOMATED COUNT: 12.7 % (ref 10–15)
HCT VFR BLD AUTO: 37.8 % (ref 35–47)
HGB BLD-MCNC: 12.5 G/DL (ref 11.7–15.7)
MCH RBC QN AUTO: 30.3 PG (ref 26.5–33)
MCHC RBC AUTO-ENTMCNC: 33.1 G/DL (ref 31.5–36.5)
MCV RBC AUTO: 92 FL (ref 78–100)
PLATELET # BLD AUTO: 164 10E9/L (ref 150–450)
RBC # BLD AUTO: 4.13 10E12/L (ref 3.8–5.2)
WBC # BLD AUTO: 5.1 10E9/L (ref 4–11)

## 2019-06-10 NOTE — PROGRESS NOTES
Routing this encounter to Aurelio Harrison to follow up and inform him that lab order for CBC is placed.  Ok to run the blood sample drawn.      Notes from staff to staff:      Order placed    Routing comment       Christy Carey Sara S, MD 3 days ago      Patient blood was drawn on June 7 19 and there is no orders. Please order tests.     Thanks

## 2019-10-15 DIAGNOSIS — Z12.31 VISIT FOR SCREENING MAMMOGRAM: ICD-10-CM

## 2019-10-17 ENCOUNTER — MYC MEDICAL ADVICE (OUTPATIENT)
Dept: FAMILY MEDICINE | Facility: CLINIC | Age: 59
End: 2019-10-17

## 2019-11-08 ENCOUNTER — HEALTH MAINTENANCE LETTER (OUTPATIENT)
Age: 59
End: 2019-11-08

## 2019-12-31 ENCOUNTER — OFFICE VISIT (OUTPATIENT)
Dept: FAMILY MEDICINE | Facility: CLINIC | Age: 59
End: 2019-12-31
Payer: COMMERCIAL

## 2019-12-31 VITALS
BODY MASS INDEX: 23.88 KG/M2 | HEIGHT: 63 IN | DIASTOLIC BLOOD PRESSURE: 68 MMHG | WEIGHT: 134.8 LBS | SYSTOLIC BLOOD PRESSURE: 108 MMHG | HEART RATE: 107 BPM | OXYGEN SATURATION: 98 % | TEMPERATURE: 99 F

## 2019-12-31 DIAGNOSIS — J01.90 ACUTE SINUSITIS WITH SYMPTOMS > 10 DAYS: Primary | ICD-10-CM

## 2019-12-31 PROCEDURE — 99213 OFFICE O/P EST LOW 20 MIN: CPT | Performed by: FAMILY MEDICINE

## 2019-12-31 RX ORDER — DOXYCYCLINE HYCLATE 100 MG
100 TABLET ORAL 2 TIMES DAILY
Qty: 14 TABLET | Refills: 0 | Status: SHIPPED | OUTPATIENT
Start: 2019-12-31 | End: 2020-01-07

## 2019-12-31 RX ORDER — CODEINE PHOSPHATE/GUAIFENESIN 10-100MG/5
5 LIQUID (ML) ORAL
Qty: 118 ML | Refills: 0 | Status: SHIPPED | OUTPATIENT
Start: 2019-12-31 | End: 2020-03-11

## 2019-12-31 ASSESSMENT — ENCOUNTER SYMPTOMS
SHORTNESS OF BREATH: 0
HEMATOCHEZIA: 0
PALPITATIONS: 0
SORE THROAT: 0
PARESTHESIAS: 0
FREQUENCY: 0
HEARTBURN: 0
ABDOMINAL PAIN: 0
BREAST MASS: 0
COUGH: 1
DIARRHEA: 0
JOINT SWELLING: 0
WEAKNESS: 0
NERVOUS/ANXIOUS: 0
RHINORRHEA: 1
NAUSEA: 0
HEADACHES: 1
DYSURIA: 0
CHILLS: 0
DIZZINESS: 0
CONSTIPATION: 0
FEVER: 1
HEMATURIA: 0
EYE PAIN: 0
ARTHRALGIAS: 0
MYALGIAS: 0
SINUS PRESSURE: 1

## 2019-12-31 ASSESSMENT — MIFFLIN-ST. JEOR: SCORE: 1152.64

## 2019-12-31 NOTE — PROGRESS NOTES
Subjective     Samina Lewis is a 59 year old female who presents to clinic today for the following health issues:    HPI   Chief Complaint   Patient presents with     URI     fever, dry cough onset 12/16, nasal congestion, diarrhea x3 days     Patient Active Problem List   Diagnosis     Basal cell carcinoma of skin     Urinary calculus     CARDIOVASCULAR SCREENING; LDL GOAL LESS THAN 160     Left knee pain     Allergic to bees     Easy bruising     Rosacea     Pulmonary nodule, right     History of basal cell carcinoma     Past Surgical History:   Procedure Laterality Date     COLONOSCOPY  2009     DIL URETHRA,FEMALE,INITIAL  1996     HC ENLARGE BREAST WITH IMPLANT  1989     TONSILLECTOMY  1965       Social History     Tobacco Use     Smoking status: Never Smoker     Smokeless tobacco: Never Used   Substance Use Topics     Alcohol use: Yes     Comment: 3 drinks/week     Family History   Problem Relation Age of Onset     Lipids Mother      Hypertension Mother      Hyperlipidemia Mother      Osteoporosis Mother      Heart Disease Maternal Grandmother      Hypertension Maternal Grandmother      Hyperlipidemia Maternal Grandmother      Cerebrovascular Disease Maternal Grandfather      Cancer Paternal Grandmother         Basal cell carcinoma     Alzheimer Disease Paternal Grandmother      Breast Cancer Paternal Grandmother         2 times     Heart Disease Paternal Grandfather      Respiratory Paternal Grandfather      Cancer - colorectal Paternal Aunt         70s     Cancer - colorectal Maternal Uncle         50 years old found on screening colonoscopy     Breast Cancer Cousin      Breast Cancer Other          Current Outpatient Medications   Medication Sig Dispense Refill     EPINEPHrine (EPIPEN) 0.3 MG/0.3ML injection Inject 0.3 mLs (0.3 mg) into the muscle once as needed (Patient not taking: Reported on 12/12/2017) 2 each 3     Allergies   Allergen Reactions     Bee Venom      Swelling     Nkda [No Known Drug  "Allergies]        1. Sinus congestion: Started December 16.  Head congestion.  States of fever 12/26.  States of cough.  Chest congestion.  Constant running nose.  States of diarrhea symptoms.  She dried mucinex with minimal improvement.  Possible sick contact with  and grandkids.  Decreased appetite.  Took advil with fever.     Review of Systems   Constitutional: Positive for fever. Negative for chills.   HENT: Positive for rhinorrhea and sinus pressure. Negative for congestion, ear pain, hearing loss and sore throat.    Eyes: Negative for pain and visual disturbance.   Respiratory: Positive for cough (dry). Negative for shortness of breath.    Cardiovascular: Negative for chest pain, palpitations and peripheral edema.   Gastrointestinal: Negative for abdominal pain, constipation, diarrhea, heartburn, hematochezia and nausea.   Breasts:  Negative for tenderness, breast mass and discharge.   Genitourinary: Negative for dysuria, frequency, genital sores, hematuria, pelvic pain, urgency, vaginal bleeding and vaginal discharge.   Musculoskeletal: Negative for arthralgias, joint swelling and myalgias.   Skin: Negative for rash.   Neurological: Positive for headaches. Negative for dizziness, weakness and paresthesias.   Psychiatric/Behavioral: Negative for mood changes. The patient is not nervous/anxious.             Objective    /68 (BP Location: Left arm, Cuff Size: Adult Regular)   Pulse 107   Temp 99  F (37.2  C) (Tympanic)   Ht 1.588 m (5' 2.5\")   Wt 61.1 kg (134 lb 12.8 oz)   LMP  (LMP Unknown)   SpO2 98%   BMI 24.26 kg/m    Body mass index is 24.26 kg/m .  Physical Exam  Constitutional:       General: She is not in acute distress.  HENT:      Head: Normocephalic and atraumatic.      Right Ear: Tympanic membrane normal.      Left Ear: Tympanic membrane normal.      Nose: Congestion (sinus) present.      Comments: sinus tenderness bilaterally     Mouth/Throat:      Pharynx: No oropharyngeal " exudate.   Eyes:      Conjunctiva/sclera: Conjunctivae normal.   Neck:      Musculoskeletal: Normal range of motion.      Trachea: No tracheal deviation.   Cardiovascular:      Rate and Rhythm: Normal rate and regular rhythm.      Heart sounds: Normal heart sounds.   Pulmonary:      Effort: Pulmonary effort is normal. No respiratory distress.      Breath sounds: Normal breath sounds. No wheezing or rales.   Skin:     Findings: No rash.   Neurological:      Mental Status: She is alert.        Assessment & Plan     1. Acute sinusitis with symptoms > 10 days  Encourage nasal saline rinse kit.   - doxycycline hyclate (VIBRA-TABS) 100 MG tablet; Take 1 tablet (100 mg) by mouth 2 times daily for 7 days  Dispense: 14 tablet; Refill: 0  - guaiFENesin-codeine (GUAIFENESIN AC) 100-10 MG/5ML syrup; Take 5 mLs by mouth nightly as needed for congestion  Dispense: 118 mL; Refill: 0     See Patient Instructions    No follow-ups on file.    Sharif Mir DO  [facility]

## 2019-12-31 NOTE — PATIENT INSTRUCTIONS
Rosa Haas,    Thank you for allowing [facility] to manage your care.    I sent your prescriptions to your pharmacy.    I encourage over the counter nasal saline rinse kit (Neilmed) daily for nasal congestion.     If you have any questions or concerns, please feel free to call us at [phone].    Sincerely,    Dr. Mir    Did you know?  You can schedule an e-Visit for certain simple non-emergent issue for your convenience.  To learn more about or start an eVisit, simply login to [employer], click  Visits  on top banner, click  Start a Virtual Visit  drop down, and click  Symptom-Specific E-Visit

## 2019-12-31 NOTE — NURSING NOTE
"Initial /68 (BP Location: Left arm, Cuff Size: Adult Regular)   Pulse 107   Temp 99  F (37.2  C) (Tympanic)   Ht 1.588 m (5' 2.5\")   Wt 61.1 kg (134 lb 12.8 oz)   LMP  (LMP Unknown)   SpO2 98%   BMI 24.26 kg/m   Estimated body mass index is 24.26 kg/m  as calculated from the following:    Height as of this encounter: 1.588 m (5' 2.5\").    Weight as of this encounter: 61.1 kg (134 lb 12.8 oz). .    "

## 2020-03-04 ASSESSMENT — MIFFLIN-ST. JEOR: SCORE: 1149.01

## 2020-03-11 ENCOUNTER — HOSPITAL ENCOUNTER (OUTPATIENT)
Facility: AMBULATORY SURGERY CENTER | Age: 60
Discharge: HOME | End: 2020-03-11
Attending: SPECIALIST | Admitting: SPECIALIST
Payer: COMMERCIAL

## 2020-03-11 VITALS
RESPIRATION RATE: 16 BRPM | SYSTOLIC BLOOD PRESSURE: 111 MMHG | HEART RATE: 70 BPM | WEIGHT: 134 LBS | TEMPERATURE: 98.5 F | BODY MASS INDEX: 23.74 KG/M2 | OXYGEN SATURATION: 100 % | DIASTOLIC BLOOD PRESSURE: 76 MMHG | HEIGHT: 63 IN

## 2020-03-11 DIAGNOSIS — Z12.11 SCREEN FOR COLON CANCER: Primary | ICD-10-CM

## 2020-03-11 LAB — COLONOSCOPY: NORMAL

## 2020-03-11 PROCEDURE — 45378 DIAGNOSTIC COLONOSCOPY: CPT

## 2020-03-11 PROCEDURE — G8918 PT W/O PREOP ORDER IV AB PRO: HCPCS

## 2020-03-11 PROCEDURE — G8907 PT DOC NO EVENTS ON DISCHARG: HCPCS

## 2020-03-11 RX ORDER — ONDANSETRON 2 MG/ML
4 INJECTION INTRAMUSCULAR; INTRAVENOUS
Status: DISCONTINUED | OUTPATIENT
Start: 2020-03-11 | End: 2020-03-12 | Stop reason: HOSPADM

## 2020-03-11 RX ORDER — LIDOCAINE 40 MG/G
CREAM TOPICAL
Status: DISCONTINUED | OUTPATIENT
Start: 2020-03-11 | End: 2020-03-12 | Stop reason: HOSPADM

## 2020-03-11 RX ORDER — FENTANYL CITRATE 50 UG/ML
INJECTION, SOLUTION INTRAMUSCULAR; INTRAVENOUS PRN
Status: DISCONTINUED | OUTPATIENT
Start: 2020-03-11 | End: 2020-03-11 | Stop reason: HOSPADM

## 2020-03-11 NOTE — H&P
Pre-Endoscopy History and Physical     Samina Lewis MRN# [id number]   YOB: 1960 Age: 59 year old     Date of Procedure: 3/11/2020  Primary care provider: Crystal Zafar  Type of Endoscopy: Colonoscopy with possible biopsy, possible polypectomy  Reason for Procedure: family history of colon cancer, family history of colon polyps  Type of Anesthesia Anticipated: Conscious Sedation    HPI:    Samina is a 59 year old female who will be undergoing the above procedure.      A history and physical has been performed. The patient's medications and allergies have been reviewed. The risks and benefits of the procedure and the sedation options and risks were discussed with the patient.  All questions were answered and informed consent was obtained.      She denies a personal or family history of anesthesia complications or bleeding disorders.     Patient Active Problem List   Diagnosis     Basal cell carcinoma of skin     Urinary calculus     CARDIOVASCULAR SCREENING; LDL GOAL LESS THAN 160     Left knee pain     Allergic to bees     Easy bruising     Rosacea     Pulmonary nodule, right     History of basal cell carcinoma        Past Medical History:   Diagnosis Date     Basal cell carcinoma of the skin 2004    back     MEDICAL HISTORY OF - 11/09    colonoscopy done repeat 5-10 years     Urinary calculus, unspecified 1994    Renal stones        Past Surgical History:   Procedure Laterality Date     COLONOSCOPY  2009     DIL URETHRA,FEMALE,INITIAL  1996     HC ENLARGE BREAST WITH IMPLANT  1989     TONSILLECTOMY  1965       Social History     Tobacco Use     Smoking status: Never Smoker     Smokeless tobacco: Never Used   Substance Use Topics     Alcohol use: Yes     Comment: 3 drinks/week       Family History   Problem Relation Age of Onset     Lipids Mother      Hypertension Mother      Hyperlipidemia Mother      Osteoporosis Mother      Heart Disease Maternal Grandmother      Hypertension Maternal  "Grandmother      Hyperlipidemia Maternal Grandmother      Cerebrovascular Disease Maternal Grandfather      Cancer Paternal Grandmother         Basal cell carcinoma     Alzheimer Disease Paternal Grandmother      Breast Cancer Paternal Grandmother         2 times     Heart Disease Paternal Grandfather      Respiratory Paternal Grandfather      Cancer - colorectal Paternal Aunt         70s     Cancer - colorectal Maternal Uncle         50 years old found on screening colonoscopy     Breast Cancer Cousin      Breast Cancer Other        Prior to Admission medications    Medication Sig Start Date End Date Taking? Authorizing Provider   polyethylene glycol (GOLYTELY) 236 g suspension Take 4,000 mLs (4 L) by mouth See Admin Instructions Patient to follow [facility] [place] Instructions 3/2/20  Yes Garth Esquivel MD   Sod Picosulfate-Mag Ox-Cit Acd (CLENPIQ) 10-3.5-12 MG-GM -GM/160ML SOLN Take 1 Bottle by mouth See Admin Instructions Patient to follow [facility] Instructions 3/2/20  Yes Garth Esquivel MD   doxycycline hyclate (VIBRA-TABS) 100 MG tablet Take 1 tablet (100 mg) by mouth 2 times daily for 7 days 12/31/19 1/7/20  Sharif Mir,    EPINEPHrine (EPIPEN) 0.3 MG/0.3ML injection Inject 0.3 mLs (0.3 mg) into the muscle once as needed  Patient not taking: Reported on 12/12/2017 7/23/14   Crystal Zafar MD       Allergies   Allergen Reactions     Bee Venom      Swelling     Nkda [No Known Drug Allergies]         REVIEW OF SYSTEMS:   5 point ROS negative except as noted above in HPI, including Gen., Resp., CV, GI &  system review.    PHYSICAL EXAM:   /87   Pulse 97   Temp 98.5  F (36.9  C)   Resp 16   Ht 1.588 m (5' 2.5\")   Wt 60.8 kg (134 lb)   LMP  (LMP Unknown)   SpO2 99%   BMI 24.12 kg/m   Estimated body mass index is 24.12 kg/m  as calculated from the following:    Height as of this encounter: 1.588 m (5' 2.5\").    Weight as of this encounter: 60.8 kg " (134 lb).   GENERAL APPEARANCE: alert, and oriented  MENTAL STATUS: alert  AIRWAY EXAM: Mallampatti Class II (visualization of the soft palate, fauces, and uvula)  RESP: lungs clear to auscultation - no rales, rhonchi or wheezes  CV: regular rates and rhythm  DIAGNOSTICS:    Not indicated    IMPRESSION   ASA Class 1 - Healthy patient, no medical problems    PLAN:   Plan for Colonoscopy with possible biopsy, possible polypectomy. We discussed the risks, benefits and alternatives and the patient wished to proceed.    The above has been forwarded to the consulting provider.      Signed Electronically by: Garth Esquivel MD  March 11, 2020

## 2020-11-04 DIAGNOSIS — Z12.31 VISIT FOR SCREENING MAMMOGRAM: ICD-10-CM

## 2020-12-06 ENCOUNTER — HEALTH MAINTENANCE LETTER (OUTPATIENT)
Age: 60
End: 2020-12-06

## 2021-06-23 ENCOUNTER — MYC MEDICAL ADVICE (OUTPATIENT)
Dept: FAMILY MEDICINE | Facility: CLINIC | Age: 61
End: 2021-06-23

## 2021-06-24 NOTE — TELEPHONE ENCOUNTER
Is it usual that the lot numbers of both covid vaccines are the same? In all the ones I have entered in they are always different.     Routing to provider for advice.

## 2021-06-24 NOTE — TELEPHONE ENCOUNTER
Entered Covid vaccines through [facility], immunizations are UTD

## 2021-06-24 NOTE — TELEPHONE ENCOUNTER
Would you be able to check [facility] for the vaccines or is this information only available from patient?     I don't know about the lot numbers.

## 2021-08-25 ENCOUNTER — ALLIED HEALTH/NURSE VISIT (OUTPATIENT)
Dept: FAMILY MEDICINE | Facility: CLINIC | Age: 61
End: 2021-08-25
Payer: COMMERCIAL

## 2021-08-25 DIAGNOSIS — Z23 NEED FOR VACCINATION: Primary | ICD-10-CM

## 2021-08-25 PROCEDURE — 90750 HZV VACC RECOMBINANT IM: CPT

## 2021-08-25 PROCEDURE — 99207 PR NO CHARGE NURSE ONLY: CPT

## 2021-08-25 PROCEDURE — 90471 IMMUNIZATION ADMIN: CPT

## 2021-08-25 NOTE — PROGRESS NOTES
Prior to immunization administration, verified patients identity using patient s name and date of birth. Please see Immunization Activity for additional information.     Screening Questionnaire for Adult Immunization    Are you sick today?   No   Do you have allergies to medications, food, a vaccine component or latex?   No   Have you ever had a serious reaction after receiving a vaccination?   No   Do you have a long-term health problem with heart, lung, kidney, or metabolic disease (e.g., diabetes), asthma, a blood disorder, no spleen, complement component deficiency, a cochlear implant, or a spinal fluid leak?  Are you on long-term aspirin therapy?   No   Do you have cancer, leukemia, HIV/AIDS, or any other immune system problem?   No   Do you have a parent, brother, or sister with an immune system problem?   No   In the past 3 months, have you taken medications that affect  your immune system, such as prednisone, other steroids, or anticancer drugs; drugs for the treatment of rheumatoid arthritis, Crohn s disease, or psoriasis; or have you had radiation treatments?   No   Have you had a seizure, or a brain or other nervous system problem?   No   During the past year, have you received a transfusion of blood or blood    products, or been given immune (gamma) globulin or antiviral drug?   No   For women: Are you pregnant or is there a chance you could become       pregnant during the next month?   No   Have you received any vaccinations in the past 4 weeks?   No     Immunization questionnaire answers were all negative.       injection of Shingrix given by Lima Villaseñor. Patient instructed to remain in clinic for 15 minutes afterwards, and to report any adverse reaction to me immediately.       Screening performed by Lima Villaseñor on 8/25/2021 at 10:02 AM.

## 2021-09-25 ENCOUNTER — HEALTH MAINTENANCE LETTER (OUTPATIENT)
Age: 61
End: 2021-09-25

## 2021-10-20 ENCOUNTER — ALLIED HEALTH/NURSE VISIT (OUTPATIENT)
Dept: FAMILY MEDICINE | Facility: CLINIC | Age: 61
End: 2021-10-20
Payer: COMMERCIAL

## 2021-10-20 DIAGNOSIS — Z23 NEED FOR VACCINATION: Primary | ICD-10-CM

## 2021-10-20 PROCEDURE — 90471 IMMUNIZATION ADMIN: CPT

## 2021-10-20 PROCEDURE — 90750 HZV VACC RECOMBINANT IM: CPT

## 2021-10-20 PROCEDURE — 99207 PR NO CHARGE NURSE ONLY: CPT

## 2021-10-20 NOTE — PROGRESS NOTES
Prior to immunization administration, verified patients identity using patient s name and date of birth. Please see Immunization Activity for additional information.     Screening Questionnaire for Adult Immunization    Are you sick today?   No   Do you have allergies to medications, food, a vaccine component or latex?   No   Have you ever had a serious reaction after receiving a vaccination?   No   Do you have a long-term health problem with heart, lung, kidney, or metabolic disease (e.g., diabetes), asthma, a blood disorder, no spleen, complement component deficiency, a cochlear implant, or a spinal fluid leak?  Are you on long-term aspirin therapy?   No   Do you have cancer, leukemia, HIV/AIDS, or any other immune system problem?   No   Do you have a parent, brother, or sister with an immune system problem?   No   In the past 3 months, have you taken medications that affect  your immune system, such as prednisone, other steroids, or anticancer drugs; drugs for the treatment of rheumatoid arthritis, Crohn s disease, or psoriasis; or have you had radiation treatments?   No   Have you had a seizure, or a brain or other nervous system problem?   No   During the past year, have you received a transfusion of blood or blood    products, or been given immune (gamma) globulin or antiviral drug?   No   For women: Are you pregnant or is there a chance you could become       pregnant during the next month?   No   Have you received any vaccinations in the past 4 weeks?   No     Immunization questionnaire answers were all negative.        Per orders of Dr. Zafar, injection of Shingrix given by Monique Hunter CMA. Patient instructed to remain in clinic for 15 minutes afterwards, and to report any adverse reaction to me immediately.       Screening performed by Monique Hunter CMA on 10/20/2021 at 10:03 AM.

## 2021-11-08 ENCOUNTER — ANCILLARY PROCEDURE (OUTPATIENT)
Dept: MAMMOGRAPHY | Facility: CLINIC | Age: 61
End: 2021-11-08
Payer: COMMERCIAL

## 2021-11-08 DIAGNOSIS — Z12.31 ENCOUNTER FOR SCREENING MAMMOGRAM FOR BREAST CANCER: ICD-10-CM

## 2021-11-08 PROCEDURE — 77067 SCR MAMMO BI INCL CAD: CPT | Mod: GC | Performed by: STUDENT IN AN ORGANIZED HEALTH CARE EDUCATION/TRAINING PROGRAM

## 2021-11-08 PROCEDURE — 77063 BREAST TOMOSYNTHESIS BI: CPT | Mod: GC | Performed by: STUDENT IN AN ORGANIZED HEALTH CARE EDUCATION/TRAINING PROGRAM

## 2022-01-15 ENCOUNTER — HEALTH MAINTENANCE LETTER (OUTPATIENT)
Age: 62
End: 2022-01-15

## 2022-08-17 ENCOUNTER — NURSE TRIAGE (OUTPATIENT)
Dept: FAMILY MEDICINE | Facility: CLINIC | Age: 62
End: 2022-08-17

## 2022-08-17 NOTE — TELEPHONE ENCOUNTER
"Patient calling regarding a red spot on R inside forearm after gardening yesterday. Patient stated it started swelling and is red, warm to touch, and ~2\" diameter. Patient denies pain, but states it is tender when touched. Thinks pain is maybe a 2-3/10. Has a mild amount of itching. Denies fever.     Patient is unsure if it was something she touched or if it is a bug bite. RN suggested home care options patient could try such as benadryl or hydrocortisone cream. Patient is leaving out of town Friday and curious is she needs to be seen prior. RN advised there are no same day appointments in clinic today. Patient wanted to try home care options and go to  if those did not work. RN reviewed red flag symptoms with patient and when to see emergency care. Patient agreed and understood. RN encouraged patient to call back with any other concerns.     MITCHELL Kovacs, RN         Reason for Disposition    Insect bite(s) suspected    Additional Information    Negative: Fever and localized purple or blood-colored spots or dots that are not from injury or friction    Negative: Fever and localized rash is very painful    Negative: Patient sounds very sick or weak to the triager    Negative: Looks like a boil, infected sore, deep ulcer, or other infected rash (spreading redness, pus)    Negative: Passed out (i.e., fainted, collapsed and was not responding)    Negative: Wheezing or difficulty breathing    Negative: Hoarseness, cough or tightness in the throat or chest    Negative: Swollen tongue or difficulty swallowing    Negative: Life-threatening reaction (anaphylaxis) in the past to bite from same insect and < 2 hours since bite    Negative: Sounds like a life-threatening emergency to the triager    Negative: Bee sting(s)    Negative: Spider bite(s)    Negative: Tick bite(s)    Negative: Mosquito bite(s)    Negative: Doesn't sound like an insect bite    Negative: Patient sounds very sick or weak to the triager    " Negative: SEVERE bite pain and not improved after 2 hours of pain medicine    Negative: Fever and red area    Negative: Fever and area is very tender to touch    Negative: Red streak or red line and length > 2 inches (5 cm)    Negative: Red or very tender (to touch) area, and started over 24 hours after the bite    Negative: Red or very tender (to touch) area, getting larger over 48 hours after the bite    Negative: No prior tetanus shots (or is not fully vaccinated)    Negative: Patient wants to be seen    Negative: SEVERE local itching (i.e., interferes with work, school, activities) and not improved after 24 hours of hydrocortisone cream    Negative: Scab drains pus or increases in size, and not improved after applying antibiotic ointment for 2 days    Negative: Bite starts to look bad (e.g., blister, purplish skin, ulcer)  (Exception: There is just minor swelling or small red bump.)    Negative: After 14 days the insect bite is not healed    Negative: Last tetanus shot > 10 years ago    Protocols used: RASH OR REDNESS - [id number]-O-TI, INSECT BITE-A-OH

## 2022-08-23 NOTE — NURSING NOTE
The following medication was given:     MEDICATION: Ketorolac Tromethamine  (30 mg/mL) (Toradol)  ROUTE: IM  SITE: RUQ - Gluteus  DOSE: 30 mg/ 1 ml  LOT #: [id number]  :  [employer]  EXPIRATION DATE:  01/01/19  ND: 56327-417-13    Kasandra Ferraro MA       21-Oct-2021

## 2022-09-22 ENCOUNTER — TRANSFERRED RECORDS (OUTPATIENT)
Dept: HEALTH INFORMATION MANAGEMENT | Facility: CLINIC | Age: 62
End: 2022-09-22

## 2022-11-10 ENCOUNTER — ANCILLARY PROCEDURE (OUTPATIENT)
Dept: MAMMOGRAPHY | Facility: CLINIC | Age: 62
End: 2022-11-10
Attending: FAMILY MEDICINE
Payer: COMMERCIAL

## 2022-11-10 DIAGNOSIS — Z12.31 VISIT FOR SCREENING MAMMOGRAM: ICD-10-CM

## 2022-11-10 PROCEDURE — 77063 BREAST TOMOSYNTHESIS BI: CPT | Mod: GC | Performed by: RADIOLOGY

## 2022-11-10 PROCEDURE — 77067 SCR MAMMO BI INCL CAD: CPT | Mod: GC | Performed by: RADIOLOGY

## 2022-11-21 ENCOUNTER — HOSPITAL ENCOUNTER (OUTPATIENT)
Dept: MAMMOGRAPHY | Facility: CLINIC | Age: 62
Discharge: HOME | End: 2022-11-21
Attending: FAMILY MEDICINE
Payer: COMMERCIAL

## 2022-11-21 DIAGNOSIS — R92.8 ABNORMAL MAMMOGRAM: ICD-10-CM

## 2022-11-21 PROCEDURE — G0279 TOMOSYNTHESIS, MAMMO: HCPCS | Mod: LT

## 2023-04-11 ENCOUNTER — OFFICE VISIT (OUTPATIENT)
Dept: FAMILY MEDICINE | Facility: CLINIC | Age: 63
End: 2023-04-11
Payer: COMMERCIAL

## 2023-04-11 VITALS
OXYGEN SATURATION: 99 % | HEART RATE: 72 BPM | DIASTOLIC BLOOD PRESSURE: 68 MMHG | BODY MASS INDEX: 25.52 KG/M2 | HEIGHT: 63 IN | WEIGHT: 144 LBS | SYSTOLIC BLOOD PRESSURE: 104 MMHG | RESPIRATION RATE: 16 BRPM | TEMPERATURE: 98.7 F

## 2023-04-11 DIAGNOSIS — R93.2 ABNORMAL ULTRASOUND OF LIVER: Primary | ICD-10-CM

## 2023-04-11 DIAGNOSIS — M79.622 PAIN OF LEFT UPPER ARM: ICD-10-CM

## 2023-04-11 DIAGNOSIS — K76.89 LIVER CYST: ICD-10-CM

## 2023-04-11 DIAGNOSIS — Z13.220 LIPID SCREENING: ICD-10-CM

## 2023-04-11 DIAGNOSIS — Z00.00 ROUTINE GENERAL MEDICAL EXAMINATION AT A HEALTH CARE FACILITY: Primary | ICD-10-CM

## 2023-04-11 DIAGNOSIS — Z13.1 SCREENING FOR DIABETES MELLITUS: ICD-10-CM

## 2023-04-11 DIAGNOSIS — G57.02 PIRIFORMIS SYNDROME, LEFT: ICD-10-CM

## 2023-04-11 DIAGNOSIS — E28.39 ESTROGEN DEFICIENCY: ICD-10-CM

## 2023-04-11 LAB
ALBUMIN SERPL-MCNC: 4.1 G/DL (ref 3.4–5)
ALP SERPL-CCNC: 50 U/L (ref 40–150)
ALT SERPL W P-5'-P-CCNC: 20 U/L (ref 0–50)
ANION GAP SERPL CALCULATED.3IONS-SCNC: 3 MMOL/L (ref 3–14)
AST SERPL W P-5'-P-CCNC: 17 U/L (ref 0–45)
BILIRUB SERPL-MCNC: 0.5 MG/DL (ref 0.2–1.3)
BUN SERPL-MCNC: 20 MG/DL (ref 7–30)
CALCIUM SERPL-MCNC: 9 MG/DL (ref 8.5–10.1)
CHLORIDE BLD-SCNC: 109 MMOL/L (ref 94–109)
CHOLEST SERPL-MCNC: 197 MG/DL
CO2 SERPL-SCNC: 29 MMOL/L (ref 20–32)
CREAT SERPL-MCNC: 0.95 MG/DL (ref 0.52–1.04)
FASTING STATUS PATIENT QL REPORTED: YES
GFR SERPL CREATININE-BSD FRML MDRD: 68 ML/MIN/1.73M2
GLUCOSE BLD-MCNC: 97 MG/DL (ref 70–99)
HDLC SERPL-MCNC: 68 MG/DL
LDLC SERPL CALC-MCNC: 114 MG/DL
NONHDLC SERPL-MCNC: 129 MG/DL
POTASSIUM BLD-SCNC: 4.3 MMOL/L (ref 3.4–5.3)
PROT SERPL-MCNC: 6.6 G/DL (ref 6.8–8.8)
SODIUM SERPL-SCNC: 141 MMOL/L (ref 133–144)
TRIGL SERPL-MCNC: 75 MG/DL

## 2023-04-11 PROCEDURE — 80061 LIPID PANEL: CPT | Performed by: FAMILY MEDICINE

## 2023-04-11 PROCEDURE — 99396 PREV VISIT EST AGE 40-64: CPT | Performed by: FAMILY MEDICINE

## 2023-04-11 PROCEDURE — 99213 OFFICE O/P EST LOW 20 MIN: CPT | Mod: 25 | Performed by: FAMILY MEDICINE

## 2023-04-11 PROCEDURE — 80053 COMPREHEN METABOLIC PANEL: CPT | Performed by: FAMILY MEDICINE

## 2023-04-11 PROCEDURE — 36415 COLL VENOUS BLD VENIPUNCTURE: CPT | Performed by: FAMILY MEDICINE

## 2023-04-11 RX ORDER — CHOLECALCIFEROL (VITAMIN D3) 50 MCG
1 TABLET ORAL DAILY
COMMUNITY

## 2023-04-11 ASSESSMENT — ENCOUNTER SYMPTOMS
CHILLS: 0
FREQUENCY: 0
CONSTIPATION: 0
PARESTHESIAS: 0
DIARRHEA: 0
HEADACHES: 0
NAUSEA: 0
HEMATOCHEZIA: 0
EYE PAIN: 0
JOINT SWELLING: 0
BREAST MASS: 0
SHORTNESS OF BREATH: 0
SORE THROAT: 0
HEARTBURN: 0
ARTHRALGIAS: 0
MYALGIAS: 0
FEVER: 0
NERVOUS/ANXIOUS: 0
PALPITATIONS: 0
HEMATURIA: 0
DYSURIA: 0
ABDOMINAL PAIN: 0
DIZZINESS: 0
COUGH: 0

## 2023-04-11 ASSESSMENT — PAIN SCALES - GENERAL: PAINLEVEL: NO PAIN (0)

## 2023-04-11 NOTE — PROGRESS NOTES
SUBJECTIVE:   CC: Samina is an 62 year old who presents for preventive health visit.       4/11/2023     6:58 AM   Additional Questions   Roomed by VE   Accompanied by SELF         4/11/2023     6:58 AM   Patient Reported Additional Medications   Patient reports taking the following new medications Ocuvite, Tumeric, Vitamin D        Patient has been advised of split billing requirements and indicates understanding: Yes     Healthy Habits:     Getting at least 3 servings of Calcium per day:  Yes    Bi-annual eye exam:  Yes    Dental care twice a year:  Yes    Sleep apnea or symptoms of sleep apnea:  None    Diet:  Regular (no restrictions)    Frequency of exercise:  6-7 days/week    Duration of exercise:  30-45 minutes    Taking medications regularly:  Not Applicable    Medication side effects:  Not applicable    PHQ-2 Total Score: 0    Additional concerns today:  Yes    PIRIFORMIS PAIN- has this off and on. Had it years ago too. Has helped seeing a chiropractor. Had done PT in the past but wasn't very helpful.     LIFE LINE SCREENING - did this recently. Noted as high likelihood for osteoporosis. Has not had dexa scan previously.  Also was a question of liver abnormality on the screen for aorta.  Does have history of liver cysts.     PAIN LEFT UPPER ARM - was golfing last June and felt pain in that region.  Certain movements still cause discomfort. Not daily but regularly. Doesn't take anything for it. Chest fly can cause discomfort.         Today's PHQ-2 Score:       4/11/2023     6:51 AM   PHQ-2 ( 1999 Pf[employer])   Q1: Little interest or pleasure in doing things 0   Q2: Feeling down, depressed or hopeless 0   PHQ-2 Score 0   Q1: Little interest or pleasure in doing things Not at all    Not at all   Q2: Feeling down, depressed or hopeless Not at all    Not at all   PHQ-2 Score 0    0       Have you ever done Advance Care Planning? (For example, a Health Directive, POLST, or a discussion with a medical provider or  your loved ones about your wishes): Yes, patient states has an Advance Care Planning document and will bring a copy to the clinic.    Social History     Tobacco Use     Smoking status: Never     Passive exposure: Never     Smokeless tobacco: Never   Vaping Use     Vaping status: Never Used   Substance Use Topics     Alcohol use: Yes     Comment: 3 drinks/week             4/11/2023     6:51 AM   Alcohol Use   Prescreen: >3 drinks/day or >7 drinks/week? No     Reviewed orders with patient.  Reviewed health maintenance and updated orders accordingly - Yes  BP Readings from Last 3 Encounters:   04/11/23 104/68   03/11/20 111/76   12/31/19 108/68    Wt Readings from Last 3 Encounters:   04/11/23 65.3 kg (144 lb)   03/04/20 60.8 kg (134 lb)   12/31/19 61.1 kg (134 lb 12.8 oz)                    Breast Cancer Screening:    FHS-7:       11/8/2021     4:02 PM 11/10/2022     7:46 AM 11/21/2022     8:04 AM 4/11/2023     6:52 AM   Breast CA Risk Assessment (FHS-7)   Did any of your first-degree relatives have breast or ovarian cancer? No No No Yes   Did any of your relatives have bilateral breast cancer? No Yes No Yes   Did any man in your family have breast cancer? No No No No   Did any woman in your family have breast and ovarian cancer? No No No No   Did any woman in your family have breast cancer before age 50 y? No No Yes Yes   Do you have 2 or more relatives with breast and/or ovarian cancer? No Yes Yes Yes   Do you have 2 or more relatives with breast and/or bowel cancer? Yes Yes No Yes       Mammogram Screening: Recommended annual mammography  Pertinent mammograms are reviewed under the imaging tab.    History of abnormal Pap smear: NO - age 30-65 PAP every 5 years with negative HPV co-testing recommended      Latest Ref Rng & Units 2/5/2019     3:58 PM 2/5/2019     3:45 PM 9/9/2015    12:00 AM   PAP / HPV   PAP (Historical)  NIL    NIL     HPV 16 DNA NEG^Negative  Negative      HPV 18 DNA NEG^Negative  Negative     "  Other HR HPV NEG^Negative  Negative        Reviewed and updated as needed this visit by clinical staff   Tobacco  Allergies  Meds              Reviewed and updated as needed this visit by Provider                     Review of Systems   Constitutional: Negative for chills and fever.   HENT: Negative for congestion, ear pain, hearing loss and sore throat.    Eyes: Negative for pain and visual disturbance.   Respiratory: Negative for cough and shortness of breath.    Cardiovascular: Negative for chest pain, palpitations and peripheral edema.   Gastrointestinal: Negative for abdominal pain, constipation, diarrhea, heartburn, hematochezia and nausea.   Breasts:  Negative for tenderness, breast mass and discharge.   Genitourinary: Negative for dysuria, frequency, genital sores, hematuria, pelvic pain, urgency, vaginal bleeding and vaginal discharge.   Musculoskeletal: Negative for arthralgias, joint swelling and myalgias.   Skin: Negative for rash.   Neurological: Negative for dizziness, headaches and paresthesias.   Psychiatric/Behavioral: Negative for mood changes. The patient is not nervous/anxious.           OBJECTIVE:   /68 (BP Location: Left arm, Patient Position: Chair, Cuff Size: Adult Regular)   Pulse 72   Temp 98.7  F (37.1  C) (Temporal)   Resp 16   Ht 1.6 m (5' 3\")   Wt 65.3 kg (144 lb)   LMP  (Exact Date)   SpO2 99%   Breastfeeding No   BMI 25.51 kg/m    Physical Exam  GENERAL APPEARANCE: healthy, alert and no distress  EYES: Eyes grossly normal to inspection, PERRL and conjunctivae and sclerae normal  HENT: ear canals and TM's normal, nose and mouth without ulcers or lesions, oropharynx clear and oral mucous membranes moist  NECK: no adenopathy, no asymmetry, masses, or scars and thyroid normal to palpation  RESP: lungs clear to auscultation - no rales, rhonchi or wheezes  BREAST: normal without masses, tenderness or nipple discharge and no palpable axillary masses or adenopathy  CV: " regular rate and rhythm, normal S1 S2, no S3 or S4, no murmur, click or rub, no peripheral edema and peripheral pulses strong  ABDOMEN: soft, nontender, no hepatosplenomegaly, no masses and bowel sounds normal  MS: no musculoskeletal defects are noted and gait is age appropriate without ataxia  SKIN: few seborrheic keratoses on the back.   NEURO: Normal strength and tone, sensory exam grossly normal, mentation intact and speech normal  PSYCH: mentation appears normal and affect normal/bright    Diagnostic Test Results:  Labs reviewed in Epic    ASSESSMENT/PLAN:   (Z00.00) Routine general medical examination at a health care facility  (primary encounter diagnosis)  Comment: See counseling messages  Plan: Recheck in 1 year    (E28.39) Estrogen deficiency  Comment: Patient had abnormal screening with [facility] screening.  Would recommend DEXA scan as this has not been previously done.  Plan: DX Hip/Pelvis/Spine        Will notify results.  Discussed calcium and vitamin D.  Patient eats dairy regularly.  Is currently taking vitamin D 2000 international units daily    (Z13.220) Lipid screening  Comment: Fasting  Plan: Lipid panel reflex to direct LDL Fasting        Will notify results    (Z13.1) Screening for diabetes mellitus  Comment: Fasting.  Will notify results  Plan: Comprehensive metabolic panel (BMP + Alb, Alk         Phos, ALT, AST, Total. Bili, TP)            (G57.02) Piriformis syndrome, left  Comment: Patient reports occasional issues with this.  So far she has been able to control it with exercise and periodic use of a chiropractor.  She is not having any pain today.  Plan: She will reach out if pain becomes more consistent or not resolving with her current treatments.  Would consider imaging or further referral from there.    (M79.622) Pain of left upper arm  Comment: No abnormality on exam today pain comes and goes.  She has not been having any issues in the last couple of days.  She has not noticed any  "weakness.  She is not currently treating.  Plan: Discussed potentially using ice 2-3 times per day.  Consider use of ibuprofen as anti-inflammatory.  Consider physical therapy.  Patient will consider and will reach out as needed.    Liver cyst, history  Patient has had liver cyst noted on abdominal ultrasound in 2016 and on CT done for other reasons in 2017.  On recent [facility] screening she was noted to have a potential liver abnormality.  Possibly this is related to a cyst.  We will see what the liver function tests return as and consider whether further imaging is necessary.  She is not having any abdominal pain or nausea or other concerns.    Patient has been advised of split billing requirements and indicates understanding: No      COUNSELING:  Reviewed preventive health counseling, as reflected in patient instructions       Regular exercise       Healthy diet/nutrition      BMI:   Estimated body mass index is 25.51 kg/m  as calculated from the following:    Height as of this encounter: 1.6 m (5' 3\").    Weight as of this encounter: 65.3 kg (144 lb).         She reports that she has never smoked. She has never been exposed to tobacco smoke. She has never used smokeless tobacco.          Crystal Zafar MD  [facility]  "

## 2023-04-13 ENCOUNTER — ANCILLARY PROCEDURE (OUTPATIENT)
Dept: BONE DENSITY | Facility: CLINIC | Age: 63
End: 2023-04-13
Attending: FAMILY MEDICINE
Payer: COMMERCIAL

## 2023-04-13 ENCOUNTER — ANCILLARY PROCEDURE (OUTPATIENT)
Dept: ULTRASOUND IMAGING | Facility: CLINIC | Age: 63
End: 2023-04-13
Attending: FAMILY MEDICINE
Payer: COMMERCIAL

## 2023-04-13 DIAGNOSIS — R93.2 ABNORMAL ULTRASOUND OF LIVER: ICD-10-CM

## 2023-04-13 DIAGNOSIS — E28.39 ESTROGEN DEFICIENCY: ICD-10-CM

## 2023-04-13 PROCEDURE — 76705 ECHO EXAM OF ABDOMEN: CPT | Performed by: STUDENT IN AN ORGANIZED HEALTH CARE EDUCATION/TRAINING PROGRAM

## 2023-04-13 PROCEDURE — 77080 DXA BONE DENSITY AXIAL: CPT | Performed by: RADIOLOGY

## 2023-12-04 ENCOUNTER — IMMUNIZATION (OUTPATIENT)
Dept: FAMILY MEDICINE | Facility: CLINIC | Age: 63
End: 2023-12-04
Payer: COMMERCIAL

## 2023-12-04 DIAGNOSIS — Z23 ENCOUNTER FOR IMMUNIZATION: Primary | ICD-10-CM

## 2023-12-04 PROCEDURE — 90480 ADMN SARSCOV2 VAC 1/ONLY CMP: CPT

## 2023-12-04 PROCEDURE — 91320 SARSCV2 VAC 30MCG TRS-SUC IM: CPT

## 2023-12-04 PROCEDURE — 99207 PR NO CHARGE NURSE ONLY: CPT

## 2023-12-04 NOTE — PROGRESS NOTES
Prior to immunization administration, verified patients identity using patient s name and date of birth. Please see Immunization Activity for additional information.     Screening Questionnaire for Adult Immunization    Are you sick today?   No   Do you have allergies to medications, food, a vaccine component or latex?   No   Have you ever had a serious reaction after receiving a vaccination?   No   Do you have a long-term health problem with heart, lung, kidney, or metabolic disease (e.g., diabetes), asthma, a blood disorder, no spleen, complement component deficiency, a cochlear implant, or a spinal fluid leak?  Are you on long-term aspirin therapy?   No   Do you have cancer, leukemia, HIV/AIDS, or any other immune system problem?   No   Do you have a parent, brother, or sister with an immune system problem?   No   In the past 3 months, have you taken medications that affect  your immune system, such as prednisone, other steroids, or anticancer drugs; drugs for the treatment of rheumatoid arthritis, Crohn s disease, or psoriasis; or have you had radiation treatments?   No   Have you had a seizure, or a brain or other nervous system problem?   No   During the past year, have you received a transfusion of blood or blood    products, or been given immune (gamma) globulin or antiviral drug?   No   For women: Are you pregnant or is there a chance you could become       pregnant during the next month?   No   Have you received any vaccinations in the past 4 weeks?   No     Immunization questionnaire answers were all negative.    I have reviewed the following standing orders:   This patient is due and qualifies for the Covid-19 vaccine.     Click here for COVID-19 Standing Order    I have reviewed the vaccines inclusion and exclusion criteria; No concerns regarding eligibility.     Patient instructed to remain in clinic for 15 minutes afterwards, and to report any adverse reactions.     Screening performed by Adriana  MARICARMEN Paez on 12/4/2023 at 1:25 PM.

## 2023-12-14 ENCOUNTER — ANCILLARY PROCEDURE (OUTPATIENT)
Dept: MAMMOGRAPHY | Facility: CLINIC | Age: 63
End: 2023-12-14
Attending: FAMILY MEDICINE
Payer: COMMERCIAL

## 2023-12-14 DIAGNOSIS — Z12.31 VISIT FOR SCREENING MAMMOGRAM: ICD-10-CM

## 2023-12-14 PROCEDURE — 77067 SCR MAMMO BI INCL CAD: CPT | Mod: GC

## 2023-12-14 PROCEDURE — 77063 BREAST TOMOSYNTHESIS BI: CPT | Mod: GC

## 2024-03-12 ENCOUNTER — PATIENT OUTREACH (OUTPATIENT)
Dept: CARE COORDINATION | Facility: CLINIC | Age: 64
End: 2024-03-12
Payer: COMMERCIAL

## 2024-03-26 ENCOUNTER — PATIENT OUTREACH (OUTPATIENT)
Dept: CARE COORDINATION | Facility: CLINIC | Age: 64
End: 2024-03-26
Payer: COMMERCIAL

## 2024-06-29 ENCOUNTER — HEALTH MAINTENANCE LETTER (OUTPATIENT)
Age: 64
End: 2024-06-29

## 2024-10-08 ENCOUNTER — PATIENT OUTREACH (OUTPATIENT)
Dept: CARE COORDINATION | Facility: CLINIC | Age: 64
End: 2024-10-08
Payer: COMMERCIAL

## 2024-12-16 ENCOUNTER — ANCILLARY PROCEDURE (OUTPATIENT)
Dept: MAMMOGRAPHY | Facility: CLINIC | Age: 64
End: 2024-12-16
Attending: FAMILY MEDICINE
Payer: COMMERCIAL

## 2024-12-16 DIAGNOSIS — Z12.31 VISIT FOR SCREENING MAMMOGRAM: ICD-10-CM

## 2024-12-16 PROCEDURE — 77063 BREAST TOMOSYNTHESIS BI: CPT | Mod: GC | Performed by: RADIOLOGY

## 2024-12-16 PROCEDURE — 77067 SCR MAMMO BI INCL CAD: CPT | Mod: GC | Performed by: RADIOLOGY

## 2025-06-19 ENCOUNTER — TRANSFERRED RECORDS (OUTPATIENT)
Dept: HEALTH INFORMATION MANAGEMENT | Facility: CLINIC | Age: 65
End: 2025-06-19
Payer: COMMERCIAL

## 2025-07-13 ENCOUNTER — HEALTH MAINTENANCE LETTER (OUTPATIENT)
Age: 65
End: 2025-07-13

## (undated) DEVICE — SOL WATER IRRIG 1000ML BOTTLE 07139-09

## (undated) RX ORDER — FENTANYL CITRATE 50 UG/ML
INJECTION, SOLUTION INTRAMUSCULAR; INTRAVENOUS
Status: DISPENSED
Start: 2020-03-11

## (undated) RX ORDER — SIMETHICONE 40MG/0.6ML
SUSPENSION, DROPS(FINAL DOSAGE FORM)(ML) ORAL
Status: DISPENSED
Start: 2020-03-11